# Patient Record
Sex: FEMALE | Race: OTHER | HISPANIC OR LATINO | URBAN - METROPOLITAN AREA
[De-identification: names, ages, dates, MRNs, and addresses within clinical notes are randomized per-mention and may not be internally consistent; named-entity substitution may affect disease eponyms.]

---

## 2020-12-06 ENCOUNTER — EMERGENCY (EMERGENCY)
Facility: HOSPITAL | Age: 85
LOS: 1 days | Discharge: HOME | End: 2020-12-06
Attending: EMERGENCY MEDICINE | Admitting: EMERGENCY MEDICINE
Payer: COMMERCIAL

## 2020-12-06 VITALS
TEMPERATURE: 98 F | WEIGHT: 149.91 LBS | RESPIRATION RATE: 15 BRPM | HEART RATE: 76 BPM | HEIGHT: 66 IN | DIASTOLIC BLOOD PRESSURE: 78 MMHG | SYSTOLIC BLOOD PRESSURE: 133 MMHG | OXYGEN SATURATION: 98 %

## 2020-12-06 VITALS
RESPIRATION RATE: 15 BRPM | TEMPERATURE: 99 F | DIASTOLIC BLOOD PRESSURE: 77 MMHG | SYSTOLIC BLOOD PRESSURE: 164 MMHG | OXYGEN SATURATION: 98 % | HEART RATE: 66 BPM

## 2020-12-06 LAB
ALBUMIN SERPL ELPH-MCNC: 3.8 G/DL — SIGNIFICANT CHANGE UP (ref 3.3–5)
ALP SERPL-CCNC: 97 U/L — SIGNIFICANT CHANGE UP (ref 40–120)
ALT FLD-CCNC: 19 U/L — SIGNIFICANT CHANGE UP (ref 12–78)
ANION GAP SERPL CALC-SCNC: 9 MMOL/L — SIGNIFICANT CHANGE UP (ref 5–17)
AST SERPL-CCNC: 28 U/L — SIGNIFICANT CHANGE UP (ref 15–37)
BASOPHILS # BLD AUTO: 0.1 K/UL — SIGNIFICANT CHANGE UP (ref 0–0.2)
BASOPHILS NFR BLD AUTO: 1.4 % — SIGNIFICANT CHANGE UP (ref 0–2)
BILIRUB SERPL-MCNC: 0.3 MG/DL — SIGNIFICANT CHANGE UP (ref 0.2–1.2)
BUN SERPL-MCNC: 28 MG/DL — HIGH (ref 7–23)
CALCIUM SERPL-MCNC: 9 MG/DL — SIGNIFICANT CHANGE UP (ref 8.5–10.1)
CHLORIDE SERPL-SCNC: 115 MMOL/L — HIGH (ref 96–108)
CO2 SERPL-SCNC: 19 MMOL/L — LOW (ref 22–31)
CREAT SERPL-MCNC: 1.4 MG/DL — HIGH (ref 0.5–1.3)
EOSINOPHIL # BLD AUTO: 0.62 K/UL — HIGH (ref 0–0.5)
EOSINOPHIL NFR BLD AUTO: 8.8 % — HIGH (ref 0–6)
GLUCOSE SERPL-MCNC: 107 MG/DL — HIGH (ref 70–99)
HCT VFR BLD CALC: 29 % — LOW (ref 34.5–45)
HGB BLD-MCNC: 9.1 G/DL — LOW (ref 11.5–15.5)
IMM GRANULOCYTES NFR BLD AUTO: 0.4 % — SIGNIFICANT CHANGE UP (ref 0–1.5)
LIDOCAIN IGE QN: 332 U/L — SIGNIFICANT CHANGE UP (ref 73–393)
LYMPHOCYTES # BLD AUTO: 0.81 K/UL — LOW (ref 1–3.3)
LYMPHOCYTES # BLD AUTO: 11.5 % — LOW (ref 13–44)
MCHC RBC-ENTMCNC: 29.7 PG — SIGNIFICANT CHANGE UP (ref 27–34)
MCHC RBC-ENTMCNC: 31.4 GM/DL — LOW (ref 32–36)
MCV RBC AUTO: 94.8 FL — SIGNIFICANT CHANGE UP (ref 80–100)
MONOCYTES # BLD AUTO: 1.26 K/UL — HIGH (ref 0–0.9)
MONOCYTES NFR BLD AUTO: 17.9 % — HIGH (ref 2–14)
NEUTROPHILS # BLD AUTO: 4.2 K/UL — SIGNIFICANT CHANGE UP (ref 1.8–7.4)
NEUTROPHILS NFR BLD AUTO: 60 % — SIGNIFICANT CHANGE UP (ref 43–77)
NRBC # BLD: 0 /100 WBCS — SIGNIFICANT CHANGE UP (ref 0–0)
PLATELET # BLD AUTO: 273 K/UL — SIGNIFICANT CHANGE UP (ref 150–400)
POTASSIUM SERPL-MCNC: 4.3 MMOL/L — SIGNIFICANT CHANGE UP (ref 3.5–5.3)
POTASSIUM SERPL-SCNC: 4.3 MMOL/L — SIGNIFICANT CHANGE UP (ref 3.5–5.3)
PROT SERPL-MCNC: 9.4 G/DL — HIGH (ref 6–8.3)
RBC # BLD: 3.06 M/UL — LOW (ref 3.8–5.2)
RBC # FLD: 18.9 % — HIGH (ref 10.3–14.5)
SODIUM SERPL-SCNC: 143 MMOL/L — SIGNIFICANT CHANGE UP (ref 135–145)
TROPONIN I SERPL-MCNC: <.015 NG/ML — SIGNIFICANT CHANGE UP (ref 0.01–0.04)
WBC # BLD: 7.02 K/UL — SIGNIFICANT CHANGE UP (ref 3.8–10.5)
WBC # FLD AUTO: 7.02 K/UL — SIGNIFICANT CHANGE UP (ref 3.8–10.5)

## 2020-12-06 PROCEDURE — 96374 THER/PROPH/DIAG INJ IV PUSH: CPT

## 2020-12-06 PROCEDURE — 71045 X-RAY EXAM CHEST 1 VIEW: CPT | Mod: 26

## 2020-12-06 PROCEDURE — 71045 X-RAY EXAM CHEST 1 VIEW: CPT

## 2020-12-06 PROCEDURE — 80053 COMPREHEN METABOLIC PANEL: CPT

## 2020-12-06 PROCEDURE — 83690 ASSAY OF LIPASE: CPT

## 2020-12-06 PROCEDURE — 84484 ASSAY OF TROPONIN QUANT: CPT

## 2020-12-06 PROCEDURE — 70450 CT HEAD/BRAIN W/O DYE: CPT | Mod: 26

## 2020-12-06 PROCEDURE — 36415 COLL VENOUS BLD VENIPUNCTURE: CPT

## 2020-12-06 PROCEDURE — 93010 ELECTROCARDIOGRAM REPORT: CPT

## 2020-12-06 PROCEDURE — 99285 EMERGENCY DEPT VISIT HI MDM: CPT

## 2020-12-06 PROCEDURE — 99284 EMERGENCY DEPT VISIT MOD MDM: CPT | Mod: 25

## 2020-12-06 PROCEDURE — 85025 COMPLETE CBC W/AUTO DIFF WBC: CPT

## 2020-12-06 PROCEDURE — 96375 TX/PRO/DX INJ NEW DRUG ADDON: CPT

## 2020-12-06 PROCEDURE — 70450 CT HEAD/BRAIN W/O DYE: CPT

## 2020-12-06 PROCEDURE — 93005 ELECTROCARDIOGRAM TRACING: CPT

## 2020-12-06 RX ORDER — SODIUM CHLORIDE 9 MG/ML
1000 INJECTION INTRAMUSCULAR; INTRAVENOUS; SUBCUTANEOUS ONCE
Refills: 0 | Status: COMPLETED | OUTPATIENT
Start: 2020-12-06 | End: 2020-12-06

## 2020-12-06 RX ORDER — FAMOTIDINE 10 MG/ML
20 INJECTION INTRAVENOUS ONCE
Refills: 0 | Status: COMPLETED | OUTPATIENT
Start: 2020-12-06 | End: 2020-12-06

## 2020-12-06 RX ORDER — DIPHENHYDRAMINE HCL 50 MG
25 CAPSULE ORAL ONCE
Refills: 0 | Status: COMPLETED | OUTPATIENT
Start: 2020-12-06 | End: 2020-12-06

## 2020-12-06 RX ADMIN — SODIUM CHLORIDE 1000 MILLILITER(S): 9 INJECTION INTRAMUSCULAR; INTRAVENOUS; SUBCUTANEOUS at 16:59

## 2020-12-06 RX ADMIN — Medication 25 MILLIGRAM(S): at 17:32

## 2020-12-06 RX ADMIN — SODIUM CHLORIDE 1000 MILLILITER(S): 9 INJECTION INTRAMUSCULAR; INTRAVENOUS; SUBCUTANEOUS at 15:59

## 2020-12-06 RX ADMIN — Medication 125 MILLIGRAM(S): at 17:32

## 2020-12-06 RX ADMIN — FAMOTIDINE 20 MILLIGRAM(S): 10 INJECTION INTRAVENOUS at 17:32

## 2020-12-06 NOTE — ED ADULT NURSE NOTE - CHPI ED NUR SYMPTOMS NEG
no back pain/no congestion/no diaphoresis/no dizziness/no syncope/no chest pain/no chills/no nausea/no shortness of breath/no vomiting/no fever

## 2020-12-06 NOTE — ED PROVIDER NOTE - CARE PLAN
Principal Discharge DX:	Vasovagal syncope   Principal Discharge DX:	Vasovagal syncope  Secondary Diagnosis:	Allergic reaction, initial encounter

## 2020-12-06 NOTE — ED PROVIDER NOTE - OBJECTIVE STATEMENT
84 y/o F from home with c/o syncope after having a  BM today resulting in vomiting x 1.  Pt now asymptomatic.  Pt denies cp, sob, similar symptoms in the past.

## 2020-12-06 NOTE — ED PROVIDER NOTE - PATIENT PORTAL LINK FT
You can access the FollowMyHealth Patient Portal offered by [facility] by registering at the following website: [email or web address]. By joining [email or web address]’s FollowMyHealth portal, you will also be able to view your health information using other applications (apps) compatible with our system.

## 2020-12-06 NOTE — ED ADULT NURSE NOTE - OBJECTIVE STATEMENT
85 year old female presents to the ED complaining of syncope. Patient Turkish speaking. As per daughter, patient complained of itching to vagina and anus x 1 week, daughter applied preparation H to anus and Vagisil to vagina with relief. Daughter kept applying Desitin and goldbaum lotion with relief. Daughter reports on Friday night patient was unable to sleep due to itching that returned. Today, daughter reports one hour prior to arrival, patient was on toilet and calling out for daughter, she complained of dizziness. Patient had a syncopal episode on the toilet then vomited. Daughter was concerned about the syncope so she brought the patient to the ED for evaluation. In the ED patient only complains of itching. No rash noted. Patient denies headache, dizziness, chest pain, or palpitations. Daughter and patient denies recent illness or sick contacts. Denies known COVID exposure. Daughter reports cataracts done two weeks ago, sunglasses on patient, follow up appointment was good.

## 2020-12-06 NOTE — ED ADULT NURSE REASSESSMENT NOTE - NS ED NURSE REASSESS COMMENT FT1
Discharge discussed and reviewed with daughter Natasha over the phone. Comprehensive teaching provided. Daughter demonstrates understanding of outpatient follow up plan.

## 2020-12-06 NOTE — ED ADULT NURSE NOTE - NSIMPLEMENTINTERV_GEN_ALL_ED
Implemented All Fall Risk Interventions:  [place]obler to call system. Call bell, personal items and telephone within reach. Instruct patient to call for assistance. Room bathroom lighting operational. Non-slip footwear when patient is off stretcher. Physically safe environment: no spills, clutter or unnecessary equipment. Stretcher in lowest position, wheels locked, appropriate side rails in place. Provide visual cue, wrist band, yellow gown, etc. Monitor gait and stability. Monitor for mental status changes and reorient to person, place, and time. Review medications for side effects contributing to fall risk. Reinforce activity limits and safety measures with patient and family.

## 2023-02-28 ENCOUNTER — INPATIENT (INPATIENT)
Facility: HOSPITAL | Age: 88
LOS: 2 days | Discharge: NURSING, REHAB OR LONG-TERM CARE | End: 2023-03-03
Attending: HOSPITALIST | Admitting: HOSPITALIST
Payer: MEDICARE

## 2023-02-28 VITALS
DIASTOLIC BLOOD PRESSURE: 82 MMHG | RESPIRATION RATE: 18 BRPM | HEART RATE: 88 BPM | OXYGEN SATURATION: 96 % | SYSTOLIC BLOOD PRESSURE: 142 MMHG | TEMPERATURE: 99 F

## 2023-02-28 DIAGNOSIS — R56.9 UNSPECIFIED CONVULSIONS: ICD-10-CM

## 2023-02-28 LAB
ALBUMIN SERPL ELPH-MCNC: 4.4 G/DL — SIGNIFICANT CHANGE UP (ref 3.3–5)
ALP SERPL-CCNC: 74 U/L — SIGNIFICANT CHANGE UP (ref 40–120)
ALT FLD-CCNC: 16 U/L — SIGNIFICANT CHANGE UP (ref 4–33)
ANION GAP SERPL CALC-SCNC: 11 MMOL/L — SIGNIFICANT CHANGE UP (ref 7–14)
ANISOCYTOSIS BLD QL: SIGNIFICANT CHANGE UP
APPEARANCE UR: CLEAR — SIGNIFICANT CHANGE UP
AST SERPL-CCNC: 43 U/L — HIGH (ref 4–32)
BACTERIA # UR AUTO: NEGATIVE — SIGNIFICANT CHANGE UP
BASE EXCESS BLDV CALC-SCNC: 4.1 MMOL/L — HIGH (ref -2–3)
BASOPHILS # BLD AUTO: 0.15 K/UL — SIGNIFICANT CHANGE UP (ref 0–0.2)
BASOPHILS NFR BLD AUTO: 2.6 % — HIGH (ref 0–2)
BILIRUB SERPL-MCNC: 0.6 MG/DL — SIGNIFICANT CHANGE UP (ref 0.2–1.2)
BILIRUB UR-MCNC: NEGATIVE — SIGNIFICANT CHANGE UP
BLOOD GAS VENOUS COMPREHENSIVE RESULT: SIGNIFICANT CHANGE UP
BUN SERPL-MCNC: 19 MG/DL — SIGNIFICANT CHANGE UP (ref 7–23)
CALCIUM SERPL-MCNC: 10 MG/DL — SIGNIFICANT CHANGE UP (ref 8.4–10.5)
CHLORIDE BLDV-SCNC: 105 MMOL/L — SIGNIFICANT CHANGE UP (ref 96–108)
CHLORIDE SERPL-SCNC: 104 MMOL/L — SIGNIFICANT CHANGE UP (ref 98–107)
CK SERPL-CCNC: 142 U/L — SIGNIFICANT CHANGE UP (ref 25–170)
CO2 BLDV-SCNC: 31.8 MMOL/L — HIGH (ref 22–26)
CO2 SERPL-SCNC: 25 MMOL/L — SIGNIFICANT CHANGE UP (ref 22–31)
COLOR SPEC: SIGNIFICANT CHANGE UP
CREAT SERPL-MCNC: 1 MG/DL — SIGNIFICANT CHANGE UP (ref 0.5–1.3)
DIFF PNL FLD: NEGATIVE — SIGNIFICANT CHANGE UP
EGFR: 55 ML/MIN/1.73M2 — LOW
EOSINOPHIL # BLD AUTO: 1.41 K/UL — HIGH (ref 0–0.5)
EOSINOPHIL NFR BLD AUTO: 24.1 % — HIGH (ref 0–6)
EPI CELLS # UR: 11 /HPF — HIGH (ref 0–5)
FLUAV AG NPH QL: SIGNIFICANT CHANGE UP
FLUBV AG NPH QL: SIGNIFICANT CHANGE UP
GAS PNL BLDV: 137 MMOL/L — SIGNIFICANT CHANGE UP (ref 136–145)
GAS PNL BLDV: SIGNIFICANT CHANGE UP
GIANT PLATELETS BLD QL SMEAR: PRESENT — SIGNIFICANT CHANGE UP
GLUCOSE BLDV-MCNC: 89 MG/DL — SIGNIFICANT CHANGE UP (ref 70–99)
GLUCOSE SERPL-MCNC: 92 MG/DL — SIGNIFICANT CHANGE UP (ref 70–99)
GLUCOSE UR QL: NEGATIVE — SIGNIFICANT CHANGE UP
HCO3 BLDV-SCNC: 30 MMOL/L — HIGH (ref 22–29)
HCT VFR BLD CALC: 37.8 % — SIGNIFICANT CHANGE UP (ref 34.5–45)
HCT VFR BLDA CALC: 35 % — SIGNIFICANT CHANGE UP (ref 34.5–46.5)
HGB BLD CALC-MCNC: 11.6 G/DL — LOW (ref 11.7–16.1)
HGB BLD-MCNC: 11.3 G/DL — LOW (ref 11.5–15.5)
HYALINE CASTS # UR AUTO: 2 /LPF — SIGNIFICANT CHANGE UP (ref 0–7)
HYPOCHROMIA BLD QL: SLIGHT — SIGNIFICANT CHANGE UP
IANC: 1.76 K/UL — LOW (ref 1.8–7.4)
KETONES UR-MCNC: NEGATIVE — SIGNIFICANT CHANGE UP
LACTATE BLDV-MCNC: 1.2 MMOL/L — SIGNIFICANT CHANGE UP (ref 0.5–2)
LEUKOCYTE ESTERASE UR-ACNC: ABNORMAL
LIDOCAIN IGE QN: 87 U/L — HIGH (ref 7–60)
LYMPHOCYTES # BLD AUTO: 1.06 K/UL — SIGNIFICANT CHANGE UP (ref 1–3.3)
LYMPHOCYTES # BLD AUTO: 18.1 % — SIGNIFICANT CHANGE UP (ref 13–44)
MACROCYTES BLD QL: SIGNIFICANT CHANGE UP
MAGNESIUM SERPL-MCNC: 1.8 MG/DL — SIGNIFICANT CHANGE UP (ref 1.6–2.6)
MCHC RBC-ENTMCNC: 28.4 PG — SIGNIFICANT CHANGE UP (ref 27–34)
MCHC RBC-ENTMCNC: 29.9 GM/DL — LOW (ref 32–36)
MCV RBC AUTO: 95 FL — SIGNIFICANT CHANGE UP (ref 80–100)
MONOCYTES # BLD AUTO: 1.51 K/UL — HIGH (ref 0–0.9)
MONOCYTES NFR BLD AUTO: 25.9 % — HIGH (ref 2–14)
NEUTROPHILS # BLD AUTO: 1.61 K/UL — LOW (ref 1.8–7.4)
NEUTROPHILS NFR BLD AUTO: 27.6 % — LOW (ref 43–77)
NITRITE UR-MCNC: NEGATIVE — SIGNIFICANT CHANGE UP
NT-PROBNP SERPL-SCNC: 399 PG/ML — HIGH
OVALOCYTES BLD QL SMEAR: SLIGHT — SIGNIFICANT CHANGE UP
PCO2 BLDV: 51 MMHG — SIGNIFICANT CHANGE UP (ref 39–52)
PH BLDV: 7.38 — SIGNIFICANT CHANGE UP (ref 7.32–7.43)
PH UR: 6 — SIGNIFICANT CHANGE UP (ref 5–8)
PLAT MORPH BLD: ABNORMAL
PLATELET # BLD AUTO: 180 K/UL — SIGNIFICANT CHANGE UP (ref 150–400)
PLATELET COUNT - ESTIMATE: NORMAL — SIGNIFICANT CHANGE UP
PO2 BLDV: 35 MMHG — SIGNIFICANT CHANGE UP (ref 25–45)
POIKILOCYTOSIS BLD QL AUTO: SLIGHT — SIGNIFICANT CHANGE UP
POLYCHROMASIA BLD QL SMEAR: SLIGHT — SIGNIFICANT CHANGE UP
POTASSIUM BLDV-SCNC: 3.7 MMOL/L — SIGNIFICANT CHANGE UP (ref 3.5–5.1)
POTASSIUM SERPL-MCNC: 4.8 MMOL/L — SIGNIFICANT CHANGE UP (ref 3.5–5.3)
POTASSIUM SERPL-SCNC: 4.8 MMOL/L — SIGNIFICANT CHANGE UP (ref 3.5–5.3)
PROCALCITONIN SERPL-MCNC: 0.06 NG/ML — SIGNIFICANT CHANGE UP (ref 0.02–0.1)
PROT SERPL-MCNC: 8.7 G/DL — HIGH (ref 6–8.3)
PROT UR-MCNC: ABNORMAL
RBC # BLD: 3.98 M/UL — SIGNIFICANT CHANGE UP (ref 3.8–5.2)
RBC # FLD: 17.6 % — HIGH (ref 10.3–14.5)
RBC BLD AUTO: ABNORMAL
RBC CASTS # UR COMP ASSIST: 4 /HPF — SIGNIFICANT CHANGE UP (ref 0–4)
RSV RNA NPH QL NAA+NON-PROBE: SIGNIFICANT CHANGE UP
SAO2 % BLDV: 61.2 % — LOW (ref 67–88)
SARS-COV-2 RNA SPEC QL NAA+PROBE: SIGNIFICANT CHANGE UP
SODIUM SERPL-SCNC: 140 MMOL/L — SIGNIFICANT CHANGE UP (ref 135–145)
SP GR SPEC: 1.02 — SIGNIFICANT CHANGE UP (ref 1.01–1.05)
TROPONIN T, HIGH SENSITIVITY RESULT: 25 NG/L — SIGNIFICANT CHANGE UP
TROPONIN T, HIGH SENSITIVITY RESULT: 28 NG/L — SIGNIFICANT CHANGE UP
TSH SERPL-MCNC: 1.72 UIU/ML — SIGNIFICANT CHANGE UP (ref 0.27–4.2)
UROBILINOGEN FLD QL: SIGNIFICANT CHANGE UP
VARIANT LYMPHS # BLD: 1.7 % — SIGNIFICANT CHANGE UP (ref 0–6)
WBC # BLD: 5.84 K/UL — SIGNIFICANT CHANGE UP (ref 3.8–10.5)
WBC # FLD AUTO: 5.84 K/UL — SIGNIFICANT CHANGE UP (ref 3.8–10.5)
WBC UR QL: 15 /HPF — HIGH (ref 0–5)

## 2023-02-28 PROCEDURE — 70450 CT HEAD/BRAIN W/O DYE: CPT | Mod: 26,MA

## 2023-02-28 PROCEDURE — 99285 EMERGENCY DEPT VISIT HI MDM: CPT

## 2023-02-28 PROCEDURE — 99223 1ST HOSP IP/OBS HIGH 75: CPT

## 2023-02-28 PROCEDURE — 71045 X-RAY EXAM CHEST 1 VIEW: CPT | Mod: 26

## 2023-02-28 RX ORDER — CEFTRIAXONE 500 MG/1
1000 INJECTION, POWDER, FOR SOLUTION INTRAMUSCULAR; INTRAVENOUS ONCE
Refills: 0 | Status: COMPLETED | OUTPATIENT
Start: 2023-02-28 | End: 2023-02-28

## 2023-02-28 RX ORDER — LEVETIRACETAM 250 MG/1
1000 TABLET, FILM COATED ORAL ONCE
Refills: 0 | Status: COMPLETED | OUTPATIENT
Start: 2023-02-28 | End: 2023-02-28

## 2023-02-28 RX ORDER — LOSARTAN POTASSIUM 100 MG/1
100 TABLET, FILM COATED ORAL ONCE
Refills: 0 | Status: COMPLETED | OUTPATIENT
Start: 2023-02-28 | End: 2023-02-28

## 2023-02-28 RX ORDER — SODIUM CHLORIDE 9 MG/ML
1000 INJECTION INTRAMUSCULAR; INTRAVENOUS; SUBCUTANEOUS ONCE
Refills: 0 | Status: COMPLETED | OUTPATIENT
Start: 2023-02-28 | End: 2023-02-28

## 2023-02-28 RX ADMIN — SODIUM CHLORIDE 1000 MILLILITER(S): 9 INJECTION INTRAMUSCULAR; INTRAVENOUS; SUBCUTANEOUS at 19:21

## 2023-02-28 RX ADMIN — LEVETIRACETAM 1000 MILLIGRAM(S): 250 TABLET, FILM COATED ORAL at 19:36

## 2023-02-28 RX ADMIN — LOSARTAN POTASSIUM 100 MILLIGRAM(S): 100 TABLET, FILM COATED ORAL at 23:04

## 2023-02-28 RX ADMIN — LEVETIRACETAM 400 MILLIGRAM(S): 250 TABLET, FILM COATED ORAL at 19:21

## 2023-02-28 RX ADMIN — SODIUM CHLORIDE 1000 MILLILITER(S): 9 INJECTION INTRAMUSCULAR; INTRAVENOUS; SUBCUTANEOUS at 20:21

## 2023-02-28 RX ADMIN — CEFTRIAXONE 100 MILLIGRAM(S): 500 INJECTION, POWDER, FOR SOLUTION INTRAMUSCULAR; INTRAVENOUS at 20:52

## 2023-02-28 NOTE — H&P ADULT - PROBLEM SELECTOR PLAN 5
- Place on ISS, FS qAC, CC diet  - Patient unsure of her medications, reconciled as per [employer] and ED note, med history pharmacist emailed for help in her medication reconciliation

## 2023-02-28 NOTE — CONSULT NOTE ADULT - ATTENDING COMMENTS
Episodes of unresponsiveness of unclear semiology.  Was started on levetiracetam - unsure when.  Chronic memory issues since childhood.  Severe itching.  #[id number].     Exam: Face - symmetric. Pupils 4-->3, B.   Moving all ext with good, symmetric strength.   Reflexes 2+ in arms and 0 in legs.     CT head reviewed.       A/P  Ms. Kimbrough is an 86 yo woman with episodes of unresponsiveness of unclear semiology and was started on levetiracetam - unsure by whom and when.  She does not have a neurologist.   Admit to [facility] EMU for characterization of episodes - 48 hours EEG.  Continue home dose of levetiracetam 500 mg BID.  F/U outpatient with the epilepsy team [phone].  D/W neurology resident who spoke with primary team.   Thank you Episodes of unresponsiveness of unclear semiology.  Was started on levetiracetam - unsure when.  Chronic memory issues since childhood.  Severe itching.  #[id number].     Exam: Face - symmetric. Pupils 4-->3, B.   Moving all ext with good, symmetric strength.   Reflexes 2+ in arms and 0 in legs.     CT head reviewed.       A/P  Ms. Kimbrough is an 88 yo woman with episodes of unresponsiveness of unclear semiology and was started on levetiracetam - unsure by whom and when.  She does not have a neurologist.   Admit to [facility] EMU for characterization of episodes - 48 hours EEG.  Continue home dose of levetiracetam 500 mg BID.  F/U outpatient with the epilepsy team [phone].  D/W neurology resident who spoke with primary team.   Thank you Episodes of unresponsiveness of unclear semiology.  Was started on levetiracetam - unsure when.  Chronic memory issues since childhood.  Severe itching.  #[id number].     Exam: Face - symmetric. Pupils 4-->3, B.   Moving all ext with good, symmetric strength.   Reflexes 2+ in arms and 0 in legs.     CT head reviewed.       A/P  Ms. Kimbrough is an 86 yo woman with episodes of unresponsiveness of unclear semiology and was started on levetiracetam - unsure by whom and when.  She does not have a neurologist.   Admit to [facility] EMU for characterization of episodes - 48 hours EEG.  Continue home dose of levetiracetam 500 mg BID.  F/U outpatient with the epilepsy team [phone].  D/W neurology resident who spoke with primary team.   Thank you

## 2023-02-28 NOTE — H&P ADULT - NSICDXPASTMEDICALHX_GEN_ALL_CORE_FT
PAST MEDICAL HISTORY:  Asthma     DM (diabetes mellitus)     Essential hypertension     History of seizure disorder     Hyperlipidemia     Rheumatoid disease

## 2023-02-28 NOTE — ED ADULT NURSE NOTE - ISOLATION TYPE:
came to ED for abdominal pain x2 days   today pt had nausea, vomiting for 2 hours   c/o reflux and "heartburn"
None

## 2023-02-28 NOTE — CONSULT NOTE ADULT - SUBJECTIVE AND OBJECTIVE BOX
MRN-[id number]  Patient is a 87y old  Female who presents with a chief complaint of   HPI:  Patient is a 88yo Rt handed Qat[place] speaking F with pmh of DM2, HTN, HLD,RA on prednisone and leflunomide, asthma on montelukast, seizure disorder on Keppra 500mg BID presents for episodes of unresponsiveness. History obtained by son, whom states patient has been having increasing episodes of unresponsiveness over the past few days. Patient's family is concern for seizures, however it is unknown what the semiology of the patient's seizures are. Patient is currently on Keppra 500mg BID and is uncertain if patient is compliant with medications. Patient's episodes of unresponsiveness occurred at 1550pm tody and had another episodes 2 days prior. Patient was bought in via EMS. Patient has been noticing ot have increase in urinary frequency. Patient noted to have UTI on admission.       PAST MEDICAL & SURGICAL HISTORY:  DM (diabetes mellitus)      History of seizure disorder      Rheumatoid disease        FAMILY HISTORY:    Social Hx:  Nonsmoker, no drug or alcohol use    Home Medications:    MEDICATIONS  (STANDING):    MEDICATIONS  (PRN):    Allergies  No Known Allergies    Intolerances      REVIEW OF SYSTEMS  General:	  Ophthalmologic:  Respiratory and Thorax:	  Cardiovascular:	  Gastrointestinal:	  Genitourinary:	  Musculoskeletal:	  Neurological:		    ROS: Pertinent positives in HPI, all other ROS were reviewed and are negative.      Vital Signs Last 24 Hrs  T(C): 36.9 (2023 19:48), Max: 37 (2023 17:23)  T(F): 98.5 (2023 19:48), Max: 98.6 (2023 17:23)  HR: 75 (2023 19:48) (75 - 88)  BP: 190/78 (2023 19:48) (142/82 - 190/78)  BP(mean): --  RR: 16 (2023 19:48) (16 - 18)  SpO2: 98% (2023 19:48) (96% - 98%)    Parameters below as of 2023 19:48  Patient On (Oxygen Delivery Method): room air        GENERAL EXAM:  Constitutional: awake and alert. NAD  HEENT: PERRL, EOMI  Musculoskeletal: no joint swelling/tenderness, no abnormal movements  Skin: no rashes    NEUROLOGICAL EXAM:  MS: AAox2 to person and place and follows commands.   CN: VFF, EOMI, PERRL  V1-3 intact, no facial asymmetry, t/p midline, SCM/trap intact.  Motor: Strength: all extremities are antigravity    Tone: normal. Bulk: normal.   DTR 2+ symm.    Plantar flex b/l.   Sensation: intact to 4x.   Coordination:     NIHSS  mRS    Labs:   cbc                      11.3   5.84  )-----------( 180      ( 2023 19:18 )             37.8     [id number]    140  |  104  |  19  ----------------------------<  92  4.8   |  25  |  1.00    Ca    10.0      2023 19:18  Mg     1.80         TPro  8.7<H>  /  Alb  4.4  /  TBili  0.6  /  DBili  x   /  AST  43<H>  /  ALT  16  /  AlkPhos  74      CardiacMarkersCARDIAC MARKERS ( 2023 19:18 )  x     / x     / 142 U/L / x     / x          LFTsLIVER FUNCTIONS - ( 2023 19:18 )  Alb: 4.4 g/dL / Pro: 8.7 g/dL / ALK PHOS: 74 U/L / ALT: 16 U/L / AST: 43 U/L / GGT: x           UAUrinalysis Basic - ( 2023 19:18 )    Color: Light Yellow / Appearance: Clear / S.016 / pH: x  Gluc: x / Ketone: Negative  / Bili: Negative / Urobili: <2 mg/dL   Blood: x / Protein: Trace / Nitrite: Negative   Leuk Esterase: Large / RBC: 4 /HPF / WBC 15 /HPF   Sq Epi: x / Non Sq Epi: 11 /HPF / Bacteria: Negative      Radiology:   MRN-[id number]  Patient is a 87y old  Female who presents with a chief complaint of   HPI:  Patient is a 88yo Rt handed British speaking F with pmh of DM2, HTN, HLD,RA on prednisone and leflunomide, asthma on montelukast, seizure disorder on Keppra 500mg BID presents for episodes of unresponsiveness. History obtained by son, whom states patient has been having increasing episodes of unresponsiveness over the past few days. Patient's family is concern for seizures, however it is unknown what the semiology of the patient's seizures are. Patient is currently on Keppra 500mg BID and is uncertain if patient is compliant with medications. Patient's episodes of unresponsiveness occurred at 1550pm tody and had another episodes 2 days prior. Patient was bought in via EMS. Patient has been noticing ot have increase in urinary frequency. Patient noted to have UTI on admission.       PAST MEDICAL & SURGICAL HISTORY:  DM (diabetes mellitus)      History of seizure disorder      Rheumatoid disease        FAMILY HISTORY:    Social Hx:  Nonsmoker, no drug or alcohol use    Home Medications:    MEDICATIONS  (STANDING):    MEDICATIONS  (PRN):    Allergies  No Known Allergies    Intolerances      REVIEW OF SYSTEMS  General:	  Ophthalmologic:  Respiratory and Thorax:	  Cardiovascular:	  Gastrointestinal:	  Genitourinary:	  Musculoskeletal:	  Neurological:		    ROS: Pertinent positives in HPI, all other ROS were reviewed and are negative.      Vital Signs Last 24 Hrs  T(C): 36.9 (2023 19:48), Max: 37 (2023 17:23)  T(F): 98.5 (2023 19:48), Max: 98.6 (2023 17:23)  HR: 75 (2023 19:48) (75 - 88)  BP: 190/78 (2023 19:48) (142/82 - 190/78)  BP(mean): --  RR: 16 (2023 19:48) (16 - 18)  SpO2: 98% (2023 19:48) (96% - 98%)    Parameters below as of 2023 19:48  Patient On (Oxygen Delivery Method): room air        GENERAL EXAM:  Constitutional: awake and alert. NAD  HEENT: PERRL, EOMI  Musculoskeletal: no joint swelling/tenderness, no abnormal movements  Skin: no rashes    NEUROLOGICAL EXAM:  MS: AAox2 to person and place and follows commands.   CN: VFF, EOMI, PERRL  V1-3 intact, no facial asymmetry, t/p midline, SCM/trap intact.  Motor: Strength: all extremities are antigravity    Tone: normal. Bulk: normal.   DTR 2+ symm.    Plantar flex b/l.   Sensation: intact to 4x.   Coordination:     NIHSS  mRS    Labs:   cbc                      11.3   5.84  )-----------( 180      ( 2023 19:18 )             37.8     [id number]    140  |  104  |  19  ----------------------------<  92  4.8   |  25  |  1.00    Ca    10.0      2023 19:18  Mg     1.80         TPro  8.7<H>  /  Alb  4.4  /  TBili  0.6  /  DBili  x   /  AST  43<H>  /  ALT  16  /  AlkPhos  74      CardiacMarkersCARDIAC MARKERS ( 2023 19:18 )  x     / x     / 142 U/L / x     / x          LFTsLIVER FUNCTIONS - ( 2023 19:18 )  Alb: 4.4 g/dL / Pro: 8.7 g/dL / ALK PHOS: 74 U/L / ALT: 16 U/L / AST: 43 U/L / GGT: x           UAUrinalysis Basic - ( 2023 19:18 )    Color: Light Yellow / Appearance: Clear / S.016 / pH: x  Gluc: x / Ketone: Negative  / Bili: Negative / Urobili: <2 mg/dL   Blood: x / Protein: Trace / Nitrite: Negative   Leuk Esterase: Large / RBC: 4 /HPF / WBC 15 /HPF   Sq Epi: x / Non Sq Epi: 11 /HPF / Bacteria: Negative      Radiology:   MRN-[id number]  Patient is a 87y old  Female who presents with a chief complaint of   HPI:  Patient is a 86yo Rt handed Citizen of [place] speaking F with pmh of DM2, HTN, HLD,RA on prednisone and leflunomide, asthma on montelukast, seizure disorder on Keppra 500mg BID presents for episodes of unresponsiveness. History obtained by son, whom states patient has been having increasing episodes of unresponsiveness over the past few days. Patient's family is concern for seizures, however it is unknown what the semiology of the patient's seizures are. Patient is currently on Keppra 500mg BID and is uncertain if patient is compliant with medications. Patient's episodes of unresponsiveness occurred at 1550pm tody and had another episodes 2 days prior. Patient was bought in via EMS. Patient has been noticing ot have increase in urinary frequency. Patient noted to have UTI on admission.       PAST MEDICAL & SURGICAL HISTORY:  DM (diabetes mellitus)      History of seizure disorder      Rheumatoid disease        FAMILY HISTORY:    Social Hx:  Nonsmoker, no drug or alcohol use    Home Medications:    MEDICATIONS  (STANDING):    MEDICATIONS  (PRN):    Allergies  No Known Allergies    Intolerances      REVIEW OF SYSTEMS  General:	  Ophthalmologic:  Respiratory and Thorax:	  Cardiovascular:	  Gastrointestinal:	  Genitourinary:	  Musculoskeletal:	  Neurological:		    ROS: Pertinent positives in HPI, all other ROS were reviewed and are negative.      Vital Signs Last 24 Hrs  T(C): 36.9 (2023 19:48), Max: 37 (2023 17:23)  T(F): 98.5 (2023 19:48), Max: 98.6 (2023 17:23)  HR: 75 (2023 19:48) (75 - 88)  BP: 190/78 (2023 19:48) (142/82 - 190/78)  BP(mean): --  RR: 16 (2023 19:48) (16 - 18)  SpO2: 98% (2023 19:48) (96% - 98%)    Parameters below as of 2023 19:48  Patient On (Oxygen Delivery Method): room air        GENERAL EXAM:  Constitutional: awake and alert. NAD  HEENT: PERRL, EOMI  Musculoskeletal: no joint swelling/tenderness, no abnormal movements  Skin: no rashes    NEUROLOGICAL EXAM:  MS: AAox2 to person and place and follows commands.   CN: VFF, EOMI, PERRL  V1-3 intact, no facial asymmetry, t/p midline, SCM/trap intact.  Motor: Strength: all extremities are antigravity    Tone: normal. Bulk: normal.   DTR 2+ symm.    Plantar flex b/l.   Sensation: intact to 4x.   Coordination:     NIHSS  mRS    Labs:   cbc                      11.3   5.84  )-----------( 180      ( 2023 19:18 )             37.8     [id number]    140  |  104  |  19  ----------------------------<  92  4.8   |  25  |  1.00    Ca    10.0      2023 19:18  Mg     1.80         TPro  8.7<H>  /  Alb  4.4  /  TBili  0.6  /  DBili  x   /  AST  43<H>  /  ALT  16  /  AlkPhos  74      CardiacMarkersCARDIAC MARKERS ( 2023 19:18 )  x     / x     / 142 U/L / x     / x          LFTsLIVER FUNCTIONS - ( 2023 19:18 )  Alb: 4.4 g/dL / Pro: 8.7 g/dL / ALK PHOS: 74 U/L / ALT: 16 U/L / AST: 43 U/L / GGT: x           UAUrinalysis Basic - ( 2023 19:18 )    Color: Light Yellow / Appearance: Clear / S.016 / pH: x  Gluc: x / Ketone: Negative  / Bili: Negative / Urobili: <2 mg/dL   Blood: x / Protein: Trace / Nitrite: Negative   Leuk Esterase: Large / RBC: 4 /HPF / WBC 15 /HPF   Sq Epi: x / Non Sq Epi: 11 /HPF / Bacteria: Negative      Radiology:   MRN-[id number]  Patient is a 87y old  Female who presents with a chief complaint of   HPI:  Patient is a 88yo Rt handed Romanian speaking F with pmh of DM2, HTN, HLD,RA on prednisone and leflunomide, asthma on montelukast, ?seizure disorder on Keppra 500mg BID presents for episodes of unresponsiveness. History obtained by son, whom states patient has been having increasing episodes of unresponsiveness over the past few days. Patient's family is concern for seizures, however it is unknown what the semiology of the patient's seizures are. Patient is currently on Keppra 500mg BID and is uncertain if patient is compliant with medications. Patient's episodes of unresponsiveness occurred at 1550pm tody and had another episodes 2 days prior. Patient was bought in via EMS. Patient has been noticing to have increase in urinary frequency. Patient noted to have UTI on admission. Patient states she was prescribed Keppra from [facility] and her son gets the medication refilled. Patient unsure when she was diagnosed with seizures.       PAST MEDICAL & SURGICAL HISTORY:  DM (diabetes mellitus)      History of seizure disorder      Rheumatoid disease        FAMILY HISTORY:    Social Hx:  Nonsmoker, no drug or alcohol use    Home Medications:    MEDICATIONS  (STANDING):    MEDICATIONS  (PRN):    Allergies  No Known Allergies    Intolerances      REVIEW OF SYSTEMS  General:Denies fever and chills	  Ophthalmologic: Denies blurred vision   Respiratory and Thorax:	Denies sob   Cardiovascular:	Denies CP  Gastrointestinal:	Denies N, V  Genitourinary:	Mentions UC  Musculoskeletal:	 Mentions muscle and joint pain   Neurological: Denies numbness and tingling 	    ROS: Pertinent positives in HPI, all other ROS were reviewed and are negative.      Vital Signs Last 24 Hrs  T(C): 36.9 (2023 19:48), Max: 37 (2023 17:23)  T(F): 98.5 (2023 19:48), Max: 98.6 (2023 17:23)  HR: 75 (2023 19:48) (75 - 88)  BP: 190/78 (2023 19:48) (142/82 - 190/78)  BP(mean): --  RR: 16 (2023 19:48) (16 - 18)  SpO2: 98% (2023 19:48) (96% - 98%)    Parameters below as of 2023 19:48  Patient On (Oxygen Delivery Method): room air        GENERAL EXAM:  Constitutional: awake and alert. NAD  HEENT: PERRL, EOMI  Musculoskeletal: no joint swelling/tenderness, no abnormal movements  Skin: no rashes    NEUROLOGICAL EXAM:  MS: AAox2 to person and place and follows commands.   CN: VFF, EOMI, PERRL  V1-3 intact, no facial asymmetry, t/p midline, SCM/trap intact.  Motor: Strength: all extremities are antigravity    Tone: normal. Bulk: normal.   Plantar flex b/l.   Sensation: intact to 4x.     Labs:   cbc                      11.3   5.84  )-----------( 180      ( 2023 19:18 )             37.8     [id number]    140  |  104  |  19  ----------------------------<  92  4.8   |  25  |  1.00    Ca    10.0      2023 19:18  Mg     1.80     -    TPro  8.7<H>  /  Alb  4.4  /  TBili  0.6  /  DBili  x   /  AST  43<H>  /  ALT  16  /  AlkPhos  74      CardiacMarkersCARDIAC MARKERS ( 2023 19:18 )  x     / x     / 142 U/L / x     / x          LFTsLIVER FUNCTIONS - ( 2023 19:18 )  Alb: 4.4 g/dL / Pro: 8.7 g/dL / ALK PHOS: 74 U/L / ALT: 16 U/L / AST: 43 U/L / GGT: x           UAUrinalysis Basic - ( 2023 19:18 )    Color: Light Yellow / Appearance: Clear / S.016 / pH: x  Gluc: x / Ketone: Negative  / Bili: Negative / Urobili: <2 mg/dL   Blood: x / Protein: Trace / Nitrite: Negative   Leuk Esterase: Large / RBC: 4 /HPF / WBC 15 /HPF   Sq Epi: x / Non Sq Epi: 11 /HPF / Bacteria: Negative      Radiology:   [id number]  Patient is a 87y old  Female who presents with a chief complaint of   HPI:  Patient is a 88yo Rt handed Grenadian speaking F with pmh of DM2, HTN, HLD,RA on prednisone and leflunomide, asthma on montelukast, ?seizure disorder on Keppra 500mg BID presents for episodes of unresponsiveness. History obtained by son, whom states patient has been having increasing episodes of unresponsiveness over the past few days. Patient's family is concern for seizures, however it is unknown what the semiology of the patient's seizures are. Patient is currently on Keppra 500mg BID and is uncertain if patient is compliant with medications. Patient's episodes of unresponsiveness occurred at 1550pm tody and had another episodes 2 days prior. Patient was bought in via EMS. Patient has been noticing to have increase in urinary frequency. Patient noted to have UTI on admission. Patient states she was prescribed Keppra from [facility] and her son gets the medication refilled. Patient unsure when she was diagnosed with seizures.       PAST MEDICAL & SURGICAL HISTORY:  DM (diabetes mellitus)      History of seizure disorder      Rheumatoid disease        FAMILY HISTORY:    Social Hx:  Nonsmoker, no drug or alcohol use    Home Medications:    MEDICATIONS  (STANDING):    MEDICATIONS  (PRN):    Allergies  No Known Allergies    Intolerances      REVIEW OF SYSTEMS  General:Denies fever and chills	  Ophthalmologic: Denies blurred vision   Respiratory and Thorax:	Denies sob   Cardiovascular:	Denies CP  Gastrointestinal:	Denies N, V  Genitourinary:	Mentions UC  Musculoskeletal:	 Mentions muscle and joint pain   Neurological: Denies numbness and tingling 	    ROS: Pertinent positives in HPI, all other ROS were reviewed and are negative.      Vital Signs Last 24 Hrs  T(C): 36.9 (2023 19:48), Max: 37 (2023 17:23)  T(F): 98.5 (2023 19:48), Max: 98.6 (2023 17:23)  HR: 75 (2023 19:48) (75 - 88)  BP: 190/78 (2023 19:48) (142/82 - 190/78)  BP(mean): --  RR: 16 (2023 19:48) (16 - 18)  SpO2: 98% (2023 19:48) (96% - 98%)    Parameters below as of 2023 19:48  Patient On (Oxygen Delivery Method): room air        GENERAL EXAM:  Constitutional: awake and alert. NAD  HEENT: PERRL, EOMI  Musculoskeletal: no joint swelling/tenderness, no abnormal movements  Skin: no rashes    NEUROLOGICAL EXAM:  MS: AAox2 to person and place and follows commands.   CN: VFF, EOMI, PERRL  V1-3 intact, no facial asymmetry, t/p midline, SCM/trap intact.  Motor: Strength: all extremities are antigravity    Tone: normal. Bulk: normal.   Plantar flex b/l.   Sensation: intact to 4x.     Labs:   cbc                      11.3   5.84  )-----------( 180      ( 2023 19:18 )             37.8     [id number]    140  |  104  |  19  ----------------------------<  92  4.8   |  25  |  1.00    Ca    10.0      2023 19:18  Mg     1.80     -    TPro  8.7<H>  /  Alb  4.4  /  TBili  0.6  /  DBili  x   /  AST  43<H>  /  ALT  16  /  AlkPhos  74      CardiacMarkersCARDIAC MARKERS ( 2023 19:18 )  x     / x     / 142 U/L / x     / x          LFTsLIVER FUNCTIONS - ( 2023 19:18 )  Alb: 4.4 g/dL / Pro: 8.7 g/dL / ALK PHOS: 74 U/L / ALT: 16 U/L / AST: 43 U/L / GGT: x           UAUrinalysis Basic - ( 2023 19:18 )    Color: Light Yellow / Appearance: Clear / S.016 / pH: x  Gluc: x / Ketone: Negative  / Bili: Negative / Urobili: <2 mg/dL   Blood: x / Protein: Trace / Nitrite: Negative   Leuk Esterase: Large / RBC: 4 /HPF / WBC 15 /HPF   Sq Epi: x / Non Sq Epi: 11 /HPF / Bacteria: Negative      Radiology:   MRN-[id number]  Patient is a 87y old  Female who presents with a chief complaint of   HPI:  Patient is a 86yo Rt handed [place] speaking F with pmh of DM2, HTN, HLD,RA on prednisone and leflunomide, asthma on montelukast, ?seizure disorder on Keppra 500mg BID presents for episodes of unresponsiveness. History obtained by son, whom states patient has been having increasing episodes of unresponsiveness over the past few days. Patient's family is concern for seizures, however it is unknown what the semiology of the patient's seizures are. Patient is currently on Keppra 500mg BID and is uncertain if patient is compliant with medications. Patient's episodes of unresponsiveness occurred at 1550pm tody and had another episodes 2 days prior. Patient was bought in via EMS. Patient has been noticing to have increase in urinary frequency. Patient noted to have UTI on admission. Patient states she was prescribed Keppra from [facility] and her son gets the medication refilled. Patient unsure when she was diagnosed with seizures.       PAST MEDICAL & SURGICAL HISTORY:  DM (diabetes mellitus)      History of seizure disorder      Rheumatoid disease        FAMILY HISTORY:    Social Hx:  Nonsmoker, no drug or alcohol use    Home Medications:    MEDICATIONS  (STANDING):    MEDICATIONS  (PRN):    Allergies  No Known Allergies    Intolerances      REVIEW OF SYSTEMS  General:Denies fever and chills	  Ophthalmologic: Denies blurred vision   Respiratory and Thorax:	Denies sob   Cardiovascular:	Denies CP  Gastrointestinal:	Denies N, V  Genitourinary:	Mentions UC  Musculoskeletal:	 Mentions muscle and joint pain   Neurological: Denies numbness and tingling 	    ROS: Pertinent positives in HPI, all other ROS were reviewed and are negative.      Vital Signs Last 24 Hrs  T(C): 36.9 (2023 19:48), Max: 37 (2023 17:23)  T(F): 98.5 (2023 19:48), Max: 98.6 (2023 17:23)  HR: 75 (2023 19:48) (75 - 88)  BP: 190/78 (2023 19:48) (142/82 - 190/78)  BP(mean): --  RR: 16 (2023 19:48) (16 - 18)  SpO2: 98% (2023 19:48) (96% - 98%)    Parameters below as of 2023 19:48  Patient On (Oxygen Delivery Method): room air        GENERAL EXAM:  Constitutional: awake and alert. NAD  HEENT: PERRL, EOMI  Musculoskeletal: no joint swelling/tenderness, no abnormal movements  Skin: no rashes    NEUROLOGICAL EXAM:  MS: AAox2 to person and place and follows commands.   CN: VFF, EOMI, PERRL  V1-3 intact, no facial asymmetry, t/p midline, SCM/trap intact.  Motor: Strength: all extremities are antigravity    Tone: normal. Bulk: normal.   Plantar flex b/l.   Sensation: intact to 4x.     Labs:   cbc                      11.3   5.84  )-----------( 180      ( 2023 19:18 )             37.8     [id number]    140  |  104  |  19  ----------------------------<  92  4.8   |  25  |  1.00    Ca    10.0      2023 19:18  Mg     1.80     -    TPro  8.7<H>  /  Alb  4.4  /  TBili  0.6  /  DBili  x   /  AST  43<H>  /  ALT  16  /  AlkPhos  74      CardiacMarkersCARDIAC MARKERS ( 2023 19:18 )  x     / x     / 142 U/L / x     / x          LFTsLIVER FUNCTIONS - ( 2023 19:18 )  Alb: 4.4 g/dL / Pro: 8.7 g/dL / ALK PHOS: 74 U/L / ALT: 16 U/L / AST: 43 U/L / GGT: x           UAUrinalysis Basic - ( 2023 19:18 )    Color: Light Yellow / Appearance: Clear / S.016 / pH: x  Gluc: x / Ketone: Negative  / Bili: Negative / Urobili: <2 mg/dL   Blood: x / Protein: Trace / Nitrite: Negative   Leuk Esterase: Large / RBC: 4 /HPF / WBC 15 /HPF   Sq Epi: x / Non Sq Epi: 11 /HPF / Bacteria: Negative      Radiology:

## 2023-02-28 NOTE — ED PROVIDER NOTE - CLINICAL SUMMARY MEDICAL DECISION MAKING FREE TEXT BOX
87F w/ PMH DM2, HTN, HLD,RA on prednisone and leflunomide, asthma on montelukast, seizure disorder on keppra  p/w recurrent seizures. pt son states she has been having several episodes of unresponsiveness which has been increasing in last few days. pt is currently keflex 500mg q 6 x 7 days Patient with increased frequency of seizures for the last 2 days in the setting of recent use of Keflex for a likely UTI. Son states patient takes Keppra 500 mg twice daily and has had increase in his seizures. Denies prior history of as many seizures in his timeframe. Patient also noted to have increased urination for last few daysPatient will obtain CBC, CMP, UA, Pro-Juan, VBG, CT head, EKG, troponin, likely admission neuro consultPatient without neck stiffness or fever. On time of exam  load w/ keppra

## 2023-02-28 NOTE — ED PROVIDER NOTE - ATTENDING CONTRIBUTION TO CARE
attending wrote note  Dr. Ramirez: I have personally seen and examined this patient at the bedside. I have fully participated in the care of this patient. I have reviewed all pertinent clinical information, including history, physical exam, plan and the Resident's note and agree except as noted. HPI above as by me. PE above as by me. DDX PLAN

## 2023-02-28 NOTE — ED ADULT TRIAGE NOTE - CHIEF COMPLAINT QUOTE
Pt brought in by EMS from home multiple episodes of unresponsiveness this week. As per EMS pts son states the episode lasted 6 mins. Pt complaining of itchiness and unable to sleep.  Pt denies chest pain, sob, n/v/d, fever or chills.

## 2023-02-28 NOTE — H&P ADULT - NSHPPHYSICALEXAM_GEN_ALL_CORE
Vital Signs Last 24 Hrs  T(C): 36.5 (01 Mar 2023 00:04), Max: 37 (28 Feb 2023 17:23)  T(F): 97.7 (01 Mar 2023 00:04), Max: 98.6 (28 Feb 2023 17:23)  HR: 89 (01 Mar 2023 00:04) (75 - 91)  BP: 172/95 (01 Mar 2023 00:04) (128/87 - 190/78)  BP(mean): --  RR: 18 (01 Mar 2023 00:04) (16 - 20)  SpO2: 99% (01 Mar 2023 00:04) (96% - 99%)    Parameters below as of 01 Mar 2023 00:04  Patient On (Oxygen Delivery Method): room air    GENERAL: No acute distress, well-developed  EYES: EOMI, PERRL, conjunctiva and sclera clear  ENT: Neck supple, No JVD, moist mucosa  CHEST/LUNG: Clear to auscultation bilaterally; No wheeze, equal breath sounds bilaterally   BACK: No spinal tenderness, no CVA TTP  HEART: Regular rate and rhythm; No murmurs, rubs, or gallops  ABDOMEN: Soft, Nontender, Nondistended; Bowel sounds present  EXTREMITIES: +Peripheral pulses, No clubbing, cyanosis, or edema  PSYCH: Nl behavior, nl affect  NEUROLOGY: AAOx2-3 (oriented to self, place (does not remember name of hospital), initially confused to time but was able to remember that she came to the ED on Tuesday), non-focal, speech fluent, tongue midline, no facial asymmetry noted, V1-V3 sensation intact to light touch, EOMI/PERRL, shoulder shrug intact b/l, SILT b/l, 5/5 strength b/l  SKIN: +Maculopapular rash on b/l arms and back with multiple areas of excoriations, No lesions seen

## 2023-02-28 NOTE — H&P ADULT - PROBLEM SELECTOR PLAN 1
- Patient with a few episodes of LOC but unclear on cause, did report having palpitations prior to her episodes so higher suspicion for cardiac cause but as per ED note patient's son reported her having seizures  - Would need to clarify LOC episodes with son, left VM for call back  - Neurology evaluation appreciated with check a routine EEG  - Check orthostatics  - Monitor on telemetry  - Monitor for further episodes of LOC  - Fall, aspiration, and seizure precautions

## 2023-02-28 NOTE — H&P ADULT - PROBLEM SELECTOR PLAN 9
DVT ppx - SCDs  Diet - Minced and moist, DASH/CC  Activity - OOB to chair/with assistance, PT eval    Fall and aspiration precautions

## 2023-02-28 NOTE — H&P ADULT - PROBLEM SELECTOR PLAN 4
- Reports having had dysuria and urinary frequency recently but unable to state when, denies any urinary complaints at present  - s/p ceftriaxone in ED, as per ED note was on keflex as outpatient  - Here UA with pyuria but also with some epithelial cells, unclear if patient still with UTI or if pyuria residual from treated UTI  - Given her eosinophilia and new pruritic rash with recent abx use concern that she might be having a drug reaction -> need to clarify timing of rash and abx use with son in AM, for now will hold off on further abx, f/u UCx

## 2023-02-28 NOTE — ED PROVIDER NOTE - NS ED ROS FT
denies fever, chills, chest pain, SOB, abdominal pain, diarrhea, +dysuria, +syncope, bleeding, new rash,weakness, numbness, blurred vision    ROS  otherwise negative as per HPI

## 2023-02-28 NOTE — H&P ADULT - NSHPREVIEWOFSYSTEMS_GEN_ALL_CORE
REVIEW OF SYSTEMS:    CONSTITUTIONAL: No weakness, fevers or chills  EYES: No visual changes or eye discharge  ENT: No rhinorrhea or sore throat  NECK: No pain or stiffness  RESPIRATORY: No cough, wheezing, hemoptysis; No shortness of breath  CARDIOVASCULAR: No chest pain, +palpitations, +LOC; No lower extremity edema  GASTROINTESTINAL: No abdominal or epigastric pain. No nausea, vomiting, or hematemesis; No diarrhea or constipation. No melena or hematochezia.  BACK: No back pain  GENITOURINARY: +dysuria, +frequency, No hematuria  NEUROLOGICAL: No numbness or weakness  SKIN: +itching +rash on arms/thorax, No burning or lesions

## 2023-02-28 NOTE — CONSULT NOTE ADULT - ASSESSMENT
Impression  Episodes of unresponsiveness 2/2 to toxic metabolic etiology ( UTI) vs hx of known seizures   Recommendations:   Continue on home AEDs Keppra 500mg Q12hr   1g load of keppra given in the ED on arrival   rEEG   Correction of UTI as per primary team   ekg   telemetry   orthostatic vital  adequate fluid hydration   correction of electrolytes as needed     Case to be seen with Neurology Attending, Dr. Han.    Patient is a 88yo Rt handed Hungarian speaking F with pmh of DM2, HTN, HLD,RA on prednisone and leflunomide, asthma on montelukast, ?seizure disorder on Keppra 500mg BID presents for episodes of unresponsiveness. History obtained by son, whom states patient has been having increasing episodes of unresponsiveness over the past few days.     Impression  Episodes of unresponsiveness 2/2 to toxic metabolic etiology ( UTI) vs hx of known seizures   Recommendations:   Continue on home AEDs Keppra 500mg Q12hr   1g load of keppra given in the ED on arrival   rEEG   Correction of UTI as per primary team   ekg   telemetry   orthostatic vital  adequate fluid hydration   correction of electrolytes as needed     Case to be seen with Neurology Attending, Dr. Han.    Patient is a 86yo Rt handed Maltese speaking F with pmh of DM2, HTN, HLD,RA on prednisone and leflunomide, asthma on montelukast, ?seizure disorder on Keppra 500mg BID presents for episodes of unresponsiveness. History obtained by son, whom states patient has been having increasing episodes of unresponsiveness over the past few days.     Impression  Episodes of unresponsiveness 2/2 to toxic metabolic etiology ( UTI) vs hx of known seizures   Recommendations:   Continue on home AEDs Keppra 500mg Q12hr   1g load of keppra given in the ED on arrival   rEEG   Correction of UTI as per primary team   ekg   telemetry   orthostatic vital  adequate fluid hydration   correction of electrolytes as needed     Case to be seen with Neurology Attending, Dr. Han.    Patient is a 86yo Rt handed Icelandic speaking F with pmh of DM2, HTN, HLD,RA on prednisone and leflunomide, asthma on montelukast, ?seizure disorder on Keppra 500mg BID presents for episodes of unresponsiveness. History obtained by son, whom states patient has been having increasing episodes of unresponsiveness over the past few days.     Impression  Episodes of unresponsiveness 2/2 to toxic metabolic etiology ( UTI) vs hx of known seizures   Recommendations:   Continue on home AEDs Keppra 500mg Q12hr   1g load of keppra given in the ED on arrival   rEEG   Correction of UTI as per primary team   ekg   telemetry   orthostatic vital  adequate fluid hydration   correction of electrolytes as needed     Case to be seen with Neurology Attending, Dr. Han.    Patient is a 86yo Rt handed Yoruba speaking F with pmh of DM2, HTN, HLD,RA on prednisone and leflunomide, asthma on montelukast, ?seizure disorder on Keppra 500mg BID presents for episodes of unresponsiveness. History obtained by son, whom states patient has been having increasing episodes of unresponsiveness over the past few days.     Impression  Episodes of unresponsiveness 2/2 to toxic metabolic etiology ( UTI) vs hx of known seizures   Recommendations:   Continue on home AEDs Keppra 500mg Q12hr   1g load of keppra given in the ED on arrival   Continuous EEG   Correction of UTI as per primary team   ekg   telemetry   orthostatic vital  adequate fluid hydration   correction of electrolytes as needed     Case to be seen with Neurology Attending, Dr. Han.    Patient is a 88yo Rt handed Divehi speaking F with pmh of DM2, HTN, HLD,RA on prednisone and leflunomide, asthma on montelukast, ?seizure disorder on Keppra 500mg BID presents for episodes of unresponsiveness. History obtained by son, whom states patient has been having increasing episodes of unresponsiveness over the past few days.     Impression  Episodes of unresponsiveness 2/2 to toxic metabolic etiology ( UTI) vs hx of known seizures   Recommendations:   Continue on home AEDs Keppra 500mg Q12hr   1g load of keppra given in the ED on arrival   Continuous EEG   Correction of UTI as per primary team   ekg   telemetry   orthostatic vital  adequate fluid hydration   correction of electrolytes as needed     Case to be seen with Neurology Attending, Dr. Han.    Patient is a 88yo Rt handed Nepali speaking F with pmh of DM2, HTN, HLD,RA on prednisone and leflunomide, asthma on montelukast, ?seizure disorder on Keppra 500mg BID presents for episodes of unresponsiveness. History obtained by son, whom states patient has been having increasing episodes of unresponsiveness over the past few days.     Impression  Episodes of unresponsiveness 2/2 to toxic metabolic etiology ( UTI) vs hx of known seizures   Recommendations:   Continue on home AEDs Keppra 500mg Q12hr   1g load of keppra given in the ED on arrival   Continuous EEG   Correction of UTI as per primary team   ekg   telemetry   orthostatic vital  adequate fluid hydration   correction of electrolytes as needed     Case to be seen with Neurology Attending, Dr. Han.

## 2023-02-28 NOTE — ED ADULT NURSE NOTE - NSIMPLEMENTINTERV_GEN_ALL_ED
Implemented All Fall with Harm Risk Interventions:  [place] to call system. Call bell, personal items and telephone within reach. Instruct patient to call for assistance. Room bathroom lighting operational. Non-slip footwear when patient is off stretcher. Physically safe environment: no spills, clutter or unnecessary equipment. Stretcher in lowest position, wheels locked, appropriate side rails in place. Provide visual cue, wrist band, yellow gown, etc. Monitor gait and stability. Monitor for mental status changes and reorient to person, place, and time. Review medications for side effects contributing to fall risk. Reinforce activity limits and safety measures with patient and family. Provide visual clues: red socks. Implemented All Fall with Harm Risk Interventions:  [place] to call system. Call bell, personal items and telephone within reach. Instruct patient to call for assistance. Room bathroom lighting operational. Non-slip footwear when patient is off stretcher. Physically safe environment: no spills, clutter or unnecessary equipment. Stretcher in lowest position, wheels locked, appropriate side rails in place. Provide visual cue, wrist band, yellow gown, etc. Monitor gait and stability. Monitor for mental status changes and reorient to person, place, and time. Review medications for side effects contributing to fall risk. Reinforce activity limits and safety measures with patient and family. Provide visual clues: red socks. Implemented All Fall with Harm Risk Interventions:  [place] to call system. Call bell, personal items and telephone within reach. Instruct patient to call for assistance. Room bathroom lighting operational. Non-slip footwear when patient is off stretcher. Physically safe environment: no spills, clutter or unnecessary equipment. Stretcher in lowest position, wheels locked, appropriate side rails in place. Provide visual cue, wrist band, yellow gown, etc. Monitor gait and stability. Monitor for mental status changes and reorient to person, place, and time. Review medications for side effects contributing to fall risk. Reinforce activity limits and safety measures with patient and family. Provide visual clues: red socks.

## 2023-02-28 NOTE — ED ADULT TRIAGE NOTE - NS ED NURSE AMBULANCES
[facility]

## 2023-02-28 NOTE — H&P ADULT - NSHPLABSRESULTS_GEN_ALL_CORE
LABS and ADDITIONAL STUDIES: LABS and ADDITIONAL STUDIES:                        11.3   5.84  )-----------( 180      ( 2023 19:18 )             37.8   Complete Blood Count + Automated Diff (23 @ 19:18)   Auto Eosinophil #: 1.41 K/uL   Auto Eosinophil %: 24.1 %   Complete Blood Count + Automated Diff (23 @ 19:18)   Auto Monocyte #: 1.51 K/uL   Auto Monocyte %: 25.9 %         140  |  104  |  19  ----------------------------<  92  4.8   |  25  |  1.00    Ca    10.0      2023 19:18  Mg     1.80         TPro  8.7<H>  /  Alb  4.4  /  TBili  0.6  /  DBili  x   /  AST  43<H>  /  ALT  16  /  AlkPhos  74      CARDIAC MARKERS ( 2023 19:18 )  x     / x     / 142 U/L / x     / x      Troponin T, High Sensitivity (23 @ 19:18)   Troponin T, High Sensitivity Result: 25 ng/L   Troponin T, High Sensitivity (23 @ 23:00)   Troponin T, High Sensitivity Result: 28  ng/L   Serum Pro-Brain Natriuretic Peptide (23 @ 19:18)   Serum Pro-Brain Natriuretic Peptide: 399 pg/mL     Procalcitonin, Serum (23 @ 19:18)   Procalcitonin, Serum: 0.06  Thyroid Stimulating Hormone, Serum (23 @ 19:18)   Thyroid Stimulating Hormone, Serum: 1.72 uIU/mL   Lipase, Serum (23 @ 19:18)   Lipase, Serum: 87 U/L     LIVER FUNCTIONS - ( 2023 19:18 )  Alb: 4.4 g/dL / Pro: 8.7 g/dL / ALK PHOS: 74 U/L / ALT: 16 U/L / AST: 43 U/L / GGT: x           Urinalysis Basic - ( 2023 19:18 )  Color: Light Yellow / Appearance: Clear / S.016 / pH: x  Gluc: x / Ketone: Negative  / Bili: Negative / Urobili: <2 mg/dL   Blood: x / Protein: Trace / Nitrite: Negative   Leuk Esterase: Large / RBC: 4 /HPF / WBC 15 /HPF   Sq Epi: x / Non Sq Epi: 11 /HPF / Bacteria: Negative    Blood Gas Venous - Lactate (23 @ 19:18)   Blood Gas Venous - Lactate: 1.2 mmol/L     < from: CT Head No Cont (23 @ 23:16) >  IMPRESSION:  No acute intracranial hemorrhage, mass effect, or evidence of acute vascular territorial infarction. If clinical symptoms persist or worsen, more sensitive evaluation with brain MRI may be obtained, if no contraindications exist.  --- End of Report ---  < end of copied text >    < from: Xray Chest 1 View- PORTABLE-Urgent (23 @ 21:36) >  ******PRELIMINARY REPORT******    IMPRESSION:  No focal consolidation, pneumothorax, or pleural effusion.  < end of copied text >    EKG - NSR with sinus arrythmia, rate 82, QTc 453, no significant ST-T wave changes

## 2023-02-28 NOTE — H&P ADULT - HISTORY OF PRESENT ILLNESS
This is an 87F with history of DM2, HTN, HLD, Rheumatoid Arthritis on Prednisone and Leflunomide, Asthma, and Seizure d/o who presents to the hospital with complaints of syncope. Patient said that she has had 2 episodes over the past few days. Most recent one was earlier today, said that she was on the toilet when she started to feel dizzy, asked her son to take her to the couch and then passed out on the couch. She does not remember how long she was out for but reports having some NBNB emesis after waking up. Patient reported some palpitations with the dizziness but otherwise no chest pain. Also reports a pruritic rash over her arms, chest, and back but cannot state how long the rash has been present for. States that she was told to take a medication for a few days for the rash but does not know the name of the medication. Also states that she had some dysuria and urinary frequency recently but currently denies any urinary complaints, no hematuria. No fevers/diaphoresis but does report chills. No other acute complaints.     Attempted to reach patient's son Wesley at [phone] but there as no . Left  for call back. As per ED documentation, the son reported to them that the patient had been having increased episodes of breakthrough seizures. Said that the most recent episode was this afternoon from 3-330 PM and consisted of the patient not responding.     On arrival to the ED, her vitals were T 98.6, P 88, /82, RR 18, O2 sat 96% RA. Her lab work was significant for eosinophilia, stable trops, and pyuria. Her imaging did not show any acute findings. She was given NS 1L, losartan 100mg PO x1, keppra 1000mg IVPB x1, and ceftriaxone 1g. She was admitted to medicine on telemetry.  This is an 87F with history of DM2, HTN, HLD, Rheumatoid Arthritis on Prednisone and Leflunomide, Asthma, and Seizure d/o who presents to the hospital with complaints of syncope. Patient said that she has had 2 episodes over the past few days. Most recent one was earlier today, said that she was on the toilet when she started to feel dizzy, asked her son to take her to the couch and then passed out on the couch. She does not remember how long she was out for but reports having some NBNB emesis after waking up. Patient reported some palpitations with the dizziness but otherwise no chest pain. Also reports a pruritic rash over her arms, chest, and back but cannot state how long the rash has been present for. States that she was told to take a medication for a few days for the rash but does not know the name of the medication. Also states that she had some dysuria and urinary frequency recently but currently denies any urinary complaints, no hematuria. No fevers/diaphoresis but does report chills. No other acute complaints.     Attempted to reach patient's son Wesley at [phone] but there as no . Left  for call back. As per ED documentation, the son reported to them that the patient had been having increased episodes of breakthrough seizures. Said that the most recent episode was this afternoon from 3-330 PM and consisted of the patient not responding.     On arrival to the ED, her vitals were T 98.6, P 88, /82, RR 18, O2 sat 96% RA. Her lab work was significant for eosinophilia, stable trops, and pyuria. Her imaging did not show any acute findings. She was given NS 1L, losartan 100mg PO x1, keppra 1000mg IVPB x1, and ceftriaxone 1g. She was admitted to medicine on telemetry.  This is an 87F with history of DM2, HTN, HLD, Rheumatoid Arthritis on Prednisone and Leflunomide, Asthma, and Seizure d/o who presents to the hospital with complaints of syncope. Patient said that she has had 2 episodes over the past few days. Most recent one was earlier today, said that she was on the toilet when she started to feel dizzy, asked her son to take her to the couch and then passed out on the couch. She does not remember how long she was out for but reports having some NBNB emesis after waking up. Patient reported some palpitations with the dizziness but otherwise no chest pain. Also reports a pruritic rash over her arms, chest, and back but cannot state how long the rash has been present for. States that she was told to take a medication for a few days for the rash but does not know the name of the medication. Also states that she had some dysuria and urinary frequency recently but currently denies any urinary complaints, no hematuria. No fevers/diaphoresis but does report chills. No other acute complaints.     Attempted to reach patient's son Wesley at [phone] but there as no . Left  for call back. As per ED documentation, the son reported to them that the patient had been having increased episodes of breakthrough seizures. Said that the most recent episode was this afternoon from 3-330 PM and consisted of the patient not responding.     On arrival to the ED, her vitals were T 98.6, P 88, /82, RR 18, O2 sat 96% RA. Her lab work was significant for eosinophilia, stable trops, and pyuria. Her imaging did not show any acute findings. She was given NS 1L, losartan 100mg PO x1, keppra 1000mg IVPB x1, and ceftriaxone 1g. She was admitted to medicine on telemetry.  This is an 87F with history of DM2, HTN, HLD, Rheumatoid Arthritis on Prednisone and Leflunomide, Asthma, and Seizure d/o who presents to the hospital with complaints of syncope. Patient said that she has had 2 episodes over the past few days. Most recent one was earlier today, said that she was on the toilet when she started to feel dizzy, asked her son to take her to the couch and then passed out on the couch. She does not remember how long she was out for but reports having some NBNB emesis after waking up. Denies any falls or head strike. Patient reported some palpitations with the dizziness but otherwise no chest pain. Also reports a pruritic rash over her arms, chest, and back but cannot state how long the rash has been present for. States that she was told to take a medication for a few days for the rash but does not know the name of the medication. Also states that she had some dysuria and urinary frequency recently but currently denies any urinary complaints, no hematuria. No fevers/diaphoresis but does report chills. No other acute complaints.     Attempted to reach patient's son Wesley at [phone] but there as no . Left  for call back. As per ED documentation, the son reported to them that the patient had been having increased episodes of breakthrough seizures. Said that the most recent episode was this afternoon from 3-330 PM and consisted of the patient not responding.     On arrival to the ED, her vitals were T 98.6, P 88, /82, RR 18, O2 sat 96% RA. Her lab work was significant for eosinophilia, stable trops, and pyuria. Her imaging did not show any acute findings. She was given NS 1L, losartan 100mg PO x1, keppra 1000mg IVPB x1, and ceftriaxone 1g. She was admitted to medicine on telemetry.  This is an 87F with history of DM2, HTN, HLD, Rheumatoid Arthritis on Prednisone and Leflunomide, Asthma, and Seizure d/o who presents to the hospital with complaints of syncope. Patient said that she has had 2 episodes over the past few days. Most recent one was earlier today, said that she was on the toilet when she started to feel dizzy, asked her son to take her to the couch and then passed out on the couch. She does not remember how long she was out for but reports having some NBNB emesis after waking up. Denies any falls or head strike. Patient reported some palpitations with the dizziness but otherwise no chest pain. Also reports a pruritic rash over her arms, chest, and back but cannot state how long the rash has been present for. States that she was told to take a medication for a few days for the rash but does not know the name of the medication. Also states that she had some dysuria and urinary frequency recently but currently denies any urinary complaints, no hematuria. No fevers/diaphoresis but does report chills. No other acute complaints.     Attempted to reach patient's son Wesley at [phone] but there as no . Left  for call back. As per ED documentation, the son reported to them that the patient had been having increased episodes of breakthrough seizures. Said that the most recent episode was this afternoon from 3-330 PM and consisted of the patient not responding.     On arrival to the ED, her vitals were T 98.6, P 88, /82, RR 18, O2 sat 96% RA. Her lab work was significant for eosinophilia, stable trops, and pyuria. Her imaging did not show any acute findings. She was given NS 1L, losartan 100mg PO x1, keppra 1000mg IVPB x1, and ceftriaxone 1g. She was admitted to medicine on telemetry.  This is an 87F with history of DM2, HTN, HLD, Rheumatoid Arthritis on Prednisone and Leflunomide, Asthma, and Seizure d/o who presents to the hospital with complaints of syncope. Patient said that she has had 2 episodes over the past few days. Most recent one was earlier today, said that she was on the toilet when she started to feel dizzy, asked her son to take her to the couch and then passed out on the couch. She does not remember how long she was out for but reports having some NBNB emesis after waking up. Denies any falls or head strike. Patient reported some palpitations with the dizziness but otherwise no chest pain. Also reports a pruritic rash over her arms, chest, and back but cannot state how long the rash has been present for. States that she was told to take a medication for a few days for the rash but does not know the name of the medication. Also states that she had some dysuria and urinary frequency recently but currently denies any urinary complaints, no hematuria. No fevers/diaphoresis but does report chills. No other acute complaints.     Attempted to reach patient's son Wesley at [phone] but there as no . Left  for call back. As per ED documentation, the son reported to them that the patient had been having increased episodes of breakthrough seizures. Said that the most recent episode was this afternoon from 3-330 PM and consisted of the patient not responding.     On arrival to the ED, her vitals were T 98.6, P 88, /82, RR 18, O2 sat 96% RA. Her lab work was significant for eosinophilia, stable trops, and pyuria. Her imaging did not show any acute findings. She was given NS 1L, losartan 100mg PO x1, keppra 1000mg IVPB x1, and ceftriaxone 1g. She was admitted to medicine on telemetry.  This is an 87F with history of DM2, HTN, HLD, Rheumatoid Arthritis on Prednisone and Leflunomide, Asthma, and Seizure d/o who presents to the hospital with complaints of syncope. Patient said that she has had 2 episodes over the past few days. Most recent one was earlier today, said that she was on the toilet when she started to feel dizzy, asked her son to take her to the couch and then passed out on the couch. She does not remember how long she was out for but reports having some NBNB emesis after waking up. Denies any falls or head strike. Patient reported some palpitations with the dizziness but otherwise no chest pain. Also reports a pruritic rash over her arms, chest, and back but cannot state how long the rash has been present for. States that she was told to take a medication for a few days for the rash but does not know the name of the medication. Also states that she had some dysuria and urinary frequency recently but currently denies any urinary complaints, no hematuria. No fevers/diaphoresis but does report chills. No other acute complaints.     Attempted to reach patient's son Wesley at [phone] but there as no . Left  for call back. As per ED documentation, the son reported to them that the patient had been having increased episodes of breakthrough seizures. Said that the most recent episode was this afternoon from 3-330 PM and consisted of the patient not responding. -> Spoke with son Wesley around 750AM on 3/1, said that patient has a history of seizures where she tenses up and stares off but no generalized tonic clonic episodes. He states that his brother was with the patient recently and therefore he did not witness the episodes but states that he was told by his brother the patient was unresponsive, unclear if she was rigid or not, no tonic-clonic activity, no tongue biting, no urinary/fecal incontinence. Said that patient had a recent seizure episode on Saturday and then and this episode yesterday, usually her seizre episodes are months apart. Due to the relative closeness of the episodes they brought her to the hospital. He also said that the patient was recently started on tramadol and developed a rash from the medication. That was stopped but the rash has persisted and the patient has not been able to sleep well due to the rash/pruritis. States that the patient missed her AM medications yesterday due to her poor sleep and believes she might have had a seizure due to the missed medication. He said that the patient gets her medications from [facility] on [facility].     On arrival to the ED, her vitals were T 98.6, P 88, /82, RR 18, O2 sat 96% RA. Her lab work was significant for eosinophilia, stable trops, and pyuria. Her imaging did not show any acute findings. She was given NS 1L, losartan 100mg PO x1, keppra 1000mg IVPB x1, and ceftriaxone 1g. She was admitted to medicine on telemetry.  This is an 87F with history of DM2, HTN, HLD, Rheumatoid Arthritis on Prednisone and Leflunomide, Asthma, and Seizure d/o who presents to the hospital with complaints of syncope. Patient said that she has had 2 episodes over the past few days. Most recent one was earlier today, said that she was on the toilet when she started to feel dizzy, asked her son to take her to the couch and then passed out on the couch. She does not remember how long she was out for but reports having some NBNB emesis after waking up. Denies any falls or head strike. Patient reported some palpitations with the dizziness but otherwise no chest pain. Also reports a pruritic rash over her arms, chest, and back but cannot state how long the rash has been present for. States that she was told to take a medication for a few days for the rash but does not know the name of the medication. Also states that she had some dysuria and urinary frequency recently but currently denies any urinary complaints, no hematuria. No fevers/diaphoresis but does report chills. No other acute complaints.     Attempted to reach patient's son Wesley at [phone] but there as no . Left  for call back. As per ED documentation, the son reported to them that the patient had been having increased episodes of breakthrough seizures. Said that the most recent episode was this afternoon from 3-330 PM and consisted of the patient not responding. -> Spoke with son Wesley around 750AM on 3/1, said that patient has a history of seizures where she tenses up and stares off but no generalized tonic clonic episodes. He states that his brother was with the patient recently and therefore he did not witness the episodes but states that he was told by his brother the patient was unresponsive, unclear if she was rigid or not, no tonic-clonic activity, no tongue biting, no urinary/fecal incontinence. Said that patient had a recent seizure episode on Saturday and then and this episode yesterday, usually her seizre episodes are months apart. Due to the relative closeness of the episodes they brought her to the hospital. He also said that the patient was recently started on tramadol and developed a rash from the medication. That was stopped but the rash has persisted and the patient has not been able to sleep well due to the rash/pruritis. States that the patient missed her AM medications yesterday due to her poor sleep and believes she might have had a seizure due to the missed medication. He said that the patient gets her medications from [facility] on [facility].     On arrival to the ED, her vitals were T 98.6, P 88, /82, RR 18, O2 sat 96% RA. Her lab work was significant for eosinophilia, stable trops, and pyuria. Her imaging did not show any acute findings. She was given NS 1L, losartan 100mg PO x1, keppra 1000mg IVPB x1, and ceftriaxone 1g. She was admitted to medicine on telemetry.  This is an 87F with history of DM2, HTN, HLD, Rheumatoid Arthritis on Prednisone and Leflunomide, Asthma, and Seizure d/o who presents to the hospital with complaints of syncope. Patient said that she has had 2 episodes over the past few days. Most recent one was earlier today, said that she was on the toilet when she started to feel dizzy, asked her son to take her to the couch and then passed out on the couch. She does not remember how long she was out for but reports having some NBNB emesis after waking up. Denies any falls or head strike. Patient reported some palpitations with the dizziness but otherwise no chest pain. Also reports a pruritic rash over her arms, chest, and back but cannot state how long the rash has been present for. States that she was told to take a medication for a few days for the rash but does not know the name of the medication. Also states that she had some dysuria and urinary frequency recently but currently denies any urinary complaints, no hematuria. No fevers/diaphoresis but does report chills. No other acute complaints.     Attempted to reach patient's son Wesley at [phone] but there as no . Left  for call back. As per ED documentation, the son reported to them that the patient had been having increased episodes of breakthrough seizures. Said that the most recent episode was this afternoon from 3-330 PM and consisted of the patient not responding. -> Spoke with son Wesley around 750AM on 3/1, said that patient has a history of seizures where she tenses up and stares off but no generalized tonic clonic episodes. He states that his brother was with the patient recently and therefore he did not witness the episodes but states that he was told by his brother the patient was unresponsive, unclear if she was rigid or not, no tonic-clonic activity, no tongue biting, no urinary/fecal incontinence. Said that patient had a recent seizure episode on Saturday and then and this episode yesterday, usually her seizre episodes are months apart. Due to the relative closeness of the episodes they brought her to the hospital. He also said that the patient was recently started on tramadol and developed a rash from the medication. That was stopped but the rash has persisted and the patient has not been able to sleep well due to the rash/pruritis. States that the patient missed her AM medications yesterday due to her poor sleep and believes she might have had a seizure due to the missed medication. He said that the patient gets her medications from [facility] on [facility] [place], [facility] by [facility].     On arrival to the ED, her vitals were T 98.6, P 88, /82, RR 18, O2 sat 96% RA. Her lab work was significant for eosinophilia, stable trops, and pyuria. Her imaging did not show any acute findings. She was given NS 1L, losartan 100mg PO x1, keppra 1000mg IVPB x1, and ceftriaxone 1g. She was admitted to medicine on telemetry.

## 2023-02-28 NOTE — ED PROVIDER NOTE - PROGRESS NOTE DETAILS
PREETI Gore (PGY-3) - pt w/ possible seizure like activity at home, here found to have negative lactate and CK, UTI on ua, will admit. neuro is consulted for eeg.

## 2023-02-28 NOTE — ED PROVIDER NOTE - NSICDXPASTMEDICALHX_GEN_ALL_CORE_FT
PAST MEDICAL HISTORY:  DM (diabetes mellitus)     History of seizure disorder     Rheumatoid disease

## 2023-02-28 NOTE — ED PROVIDER NOTE - PHYSICAL EXAMINATION
Gen: Awake, Alert, WD, WN, NAD  Head:  NC/AT  Eyes:  PERRL, EOMI, Conjunctiva pink, lids normal, no scleral icterus  ENT: OP clear, no exudates, no erythema, uvula midline, TMs clear bilaterally, moist mucus membranes  Neck: supple, nontender, no meningismus, no JVD, trachea midline  Cardiac/CV:  S1 S2, RRR, no M/G/R  Respiratory/Pulm:  CTAB, good air movement, normal resp effort, no wheezes/stridor/retractions/rales/rhonchi  Gastrointestinal/Abdomen:  Soft, nontender, nondistended, +BS, no rebound/guarding  Back:  no CVAT, no MLT  Ext:  warm, well perfused, moving all extremities spontaneously, no peripheral edema, distal pulses intact  Skin: intact, no rash  Neuro:  AAOx2, sensation intact, motor 5/5 x 4 extremities, speech clear

## 2023-02-28 NOTE — ED ADULT NURSE NOTE - OBJECTIVE STATEMENT
Break RN- received pt in TRA, 87 yr/o female A+OX2 to self and place, ambulatory with assistance. Scottish speaking able to make needs known in english. PMH of DMII, HTN, HLD, RA, and seizure on Keppra. pt was brought into ED by EMS for multiple episode of unresponsiveness. unable to determine if patient is compliant with medications. denies chest pain and SOB, RR even and unlabored. right AC 20g placed, labs drawn and sent. meds given as ordered. pt is stable at this time. Break RN- received pt in TRA, 87 yr/o female A+OX2 to self and place, ambulatory with assistance. Norwegian speaking able to make needs known in english. PMH of DMII, HTN, HLD, RA, and seizure on Keppra. pt was brought into ED by EMS for multiple episode of unresponsiveness. unable to determine if patient is compliant with medications. denies chest pain and SOB, RR even and unlabored. right AC 20g placed, labs drawn and sent. meds given as ordered. pt is stable at this time. Break RN- received pt in TRA, 87 yr/o female A+OX2 to self and place, ambulatory with assistance. Sammarinese speaking able to make needs known in english. PMH of DMII, HTN, HLD, RA, and seizure on Keppra. pt was brought into ED by EMS for multiple episode of unresponsiveness. unable to determine if patient is compliant with medications. denies chest pain and SOB, RR even and unlabored. right AC 20g placed, labs drawn and sent. meds given as ordered. pt is stable at this time.

## 2023-02-28 NOTE — ED PROVIDER NOTE - OBJECTIVE STATEMENT
87F w/ PMH DM2, HTN, HLD,RA on prednisone and leflunomide, asthma on montelukast, seizure disorder on keppra  p/w recurrent seizures. pt son states she has been having several episodes of unresponsiveness which has been increasing in last few days. pt is currently keflex 500mg q 6 x 7 days  , losartan 100mg , aspirin 81mg prn , keppra 500mg BID , prednisone 5mg daily, , januvia 50mg daily. Pt son states she had her first seizure 2 days prior , and 1 seizure today which was from 3- 330.   primary care : Dr. lico mobley  [phone]   pt states she has been more tired and urinating. Hx obtained from son which states she has been having increased urination and increased frequency of seizures in last 2-3 days. pt currently on keflex.   pt denies chest pain nausea, vomiting 87F w/ PMH DM2, HTN, HLD,RA on prednisone and leflunomide, asthma on montelukast, seizure disorder on keppra  p/w recurrent seizures. pt son states she has been having several episodes of unresponsiveness which has been increasing in last few days. pt is currently keflex 500mg q 6 x 7 days  , losartan 100mg , aspirin 81mg prn , keppra 500mg BID , prednisone 5mg daily, , januvia 50mg daily. Pt son states she had her first seizure 2 days prior , and 1 seizure today which was from 3- 330.   primary care : Dr. lico mobley  [phone]   pt states she has been more tired and urinating. Hx obtained from son which states she has been having increased urination and increased frequency of seizures in last 2-3 days. pt currently on keflex.   pt denies chest pain nausea, vomiting 87F w/ PMH DM2, HTN, HLD,RA on prednisone and leflunomide, asthma on montelukast, seizure disorder on keppra  p/w recurrent seizures. pt son states she has been having several episodes of unresponsiveness which has been increasing in last few days. pt is currently keflex 500mg q 6 x 7 days  , losartan 100mg , aspirin 81mg prn , keppra 500mg BID , prednisone 5mg daily, , januvia 50mg daily. Pt son states she had her first seizure 2 days prior , and 1 seizure today which was from 3- 330.   primary care : Dr. lico mobley  [phone]   pt states she has been more tired and urinating. Hx obtained from son which states she has been having increased urination and increased frequency of seizures in last 2-3 days. pt currently on keflex.   pt denies chest pain nausea, vomiting

## 2023-02-28 NOTE — H&P ADULT - PROBLEM SELECTOR PLAN 8
- Patient unsure of her medications, reconciled as per SureScripts and ED note, med history pharmacist emailed for help in her medication reconciliation

## 2023-02-28 NOTE — H&P ADULT - PROBLEM SELECTOR PLAN 2
- c/w home keppra 500mg BID  - obtain routine EEG, seizure precautions  - Clarify LOC episodes with son  - f/u further neurology recommendations - c/w home keppra 500mg BID  - obtain routine EEG, seizure precautions  - f/u further neurology recommendations

## 2023-02-28 NOTE — H&P ADULT - PROBLEM SELECTOR PLAN 3
- New onset of an intensely pruritic maculopapular rash, noted to have significant excoriations on her back and arms  - Unclear on time frame of rash, unclear on causation of rash  - Clarify timing of rash and any new medications with patient's son in AM  - Patient's eosinophilia likely 2/2 rash, would trend for now  - Will place patient on calamine lotion for now - New onset of an intensely pruritic maculopapular rash, noted to have significant excoriations on her back and arms  - Unclear on time frame of rash, unclear on causation of rash (possibly 2/2 tramadol as per son but unknown timing of rash)  - Clarify timing of rash and any new medications with patient's son in AM  - Patient's eosinophilia likely 2/2 rash, would trend for now  - Will place patient on calamine lotion for now

## 2023-03-01 DIAGNOSIS — Z98.49 CATARACT EXTRACTION STATUS, UNSPECIFIED EYE: Chronic | ICD-10-CM

## 2023-03-01 DIAGNOSIS — R21 RASH AND OTHER NONSPECIFIC SKIN ERUPTION: ICD-10-CM

## 2023-03-01 DIAGNOSIS — E11.9 TYPE 2 DIABETES MELLITUS WITHOUT COMPLICATIONS: ICD-10-CM

## 2023-03-01 DIAGNOSIS — M06.9 RHEUMATOID ARTHRITIS, UNSPECIFIED: ICD-10-CM

## 2023-03-01 DIAGNOSIS — Z29.9 ENCOUNTER FOR PROPHYLACTIC MEASURES, UNSPECIFIED: ICD-10-CM

## 2023-03-01 DIAGNOSIS — G40.909 EPILEPSY, UNSPECIFIED, NOT INTRACTABLE, WITHOUT STATUS EPILEPTICUS: ICD-10-CM

## 2023-03-01 DIAGNOSIS — I10 ESSENTIAL (PRIMARY) HYPERTENSION: ICD-10-CM

## 2023-03-01 DIAGNOSIS — J45.909 UNSPECIFIED ASTHMA, UNCOMPLICATED: ICD-10-CM

## 2023-03-01 DIAGNOSIS — N39.0 URINARY TRACT INFECTION, SITE NOT SPECIFIED: ICD-10-CM

## 2023-03-01 DIAGNOSIS — R55 SYNCOPE AND COLLAPSE: ICD-10-CM

## 2023-03-01 LAB
A1C WITH ESTIMATED AVERAGE GLUCOSE RESULT: 6 % — HIGH (ref 4–5.6)
ALBUMIN SERPL ELPH-MCNC: 4.1 G/DL — SIGNIFICANT CHANGE UP (ref 3.3–5)
ALP SERPL-CCNC: 70 U/L — SIGNIFICANT CHANGE UP (ref 40–120)
ALT FLD-CCNC: 16 U/L — SIGNIFICANT CHANGE UP (ref 4–33)
ANION GAP SERPL CALC-SCNC: 13 MMOL/L — SIGNIFICANT CHANGE UP (ref 7–14)
AST SERPL-CCNC: 32 U/L — SIGNIFICANT CHANGE UP (ref 4–32)
BASOPHILS # BLD AUTO: 0.15 K/UL — SIGNIFICANT CHANGE UP (ref 0–0.2)
BASOPHILS NFR BLD AUTO: 2.7 % — HIGH (ref 0–2)
BILIRUB SERPL-MCNC: 0.6 MG/DL — SIGNIFICANT CHANGE UP (ref 0.2–1.2)
BUN SERPL-MCNC: 16 MG/DL — SIGNIFICANT CHANGE UP (ref 7–23)
CALCIUM SERPL-MCNC: 9.7 MG/DL — SIGNIFICANT CHANGE UP (ref 8.4–10.5)
CHLORIDE SERPL-SCNC: 106 MMOL/L — SIGNIFICANT CHANGE UP (ref 98–107)
CO2 SERPL-SCNC: 24 MMOL/L — SIGNIFICANT CHANGE UP (ref 22–31)
CREAT SERPL-MCNC: 0.86 MG/DL — SIGNIFICANT CHANGE UP (ref 0.5–1.3)
CULTURE RESULTS: SIGNIFICANT CHANGE UP
EGFR: 66 ML/MIN/1.73M2 — SIGNIFICANT CHANGE UP
EOSINOPHIL # BLD AUTO: 1.33 K/UL — HIGH (ref 0–0.5)
EOSINOPHIL NFR BLD AUTO: 23.8 % — HIGH (ref 0–6)
ESTIMATED AVERAGE GLUCOSE: 126 — SIGNIFICANT CHANGE UP
GLUCOSE BLDC GLUCOMTR-MCNC: 106 MG/DL — HIGH (ref 70–99)
GLUCOSE BLDC GLUCOMTR-MCNC: 107 MG/DL — HIGH (ref 70–99)
GLUCOSE BLDC GLUCOMTR-MCNC: 88 MG/DL — SIGNIFICANT CHANGE UP (ref 70–99)
GLUCOSE BLDC GLUCOMTR-MCNC: 94 MG/DL — SIGNIFICANT CHANGE UP (ref 70–99)
GLUCOSE SERPL-MCNC: 93 MG/DL — SIGNIFICANT CHANGE UP (ref 70–99)
HCT VFR BLD CALC: 38.2 % — SIGNIFICANT CHANGE UP (ref 34.5–45)
HGB BLD-MCNC: 11.4 G/DL — LOW (ref 11.5–15.5)
IANC: 1.45 K/UL — LOW (ref 1.8–7.4)
IMM GRANULOCYTES NFR BLD AUTO: 0.2 % — SIGNIFICANT CHANGE UP (ref 0–0.9)
LYMPHOCYTES # BLD AUTO: 1.05 K/UL — SIGNIFICANT CHANGE UP (ref 1–3.3)
LYMPHOCYTES # BLD AUTO: 18.8 % — SIGNIFICANT CHANGE UP (ref 13–44)
MAGNESIUM SERPL-MCNC: 1.7 MG/DL — SIGNIFICANT CHANGE UP (ref 1.6–2.6)
MCHC RBC-ENTMCNC: 28.6 PG — SIGNIFICANT CHANGE UP (ref 27–34)
MCHC RBC-ENTMCNC: 29.8 GM/DL — LOW (ref 32–36)
MCV RBC AUTO: 95.7 FL — SIGNIFICANT CHANGE UP (ref 80–100)
MONOCYTES # BLD AUTO: 1.61 K/UL — HIGH (ref 0–0.9)
MONOCYTES NFR BLD AUTO: 28.8 % — HIGH (ref 2–14)
NEUTROPHILS # BLD AUTO: 1.45 K/UL — LOW (ref 1.8–7.4)
NEUTROPHILS NFR BLD AUTO: 25.7 % — LOW (ref 43–77)
NRBC # BLD: 0 /100 WBCS — SIGNIFICANT CHANGE UP (ref 0–0)
NRBC # FLD: 0 K/UL — SIGNIFICANT CHANGE UP (ref 0–0)
PHOSPHATE SERPL-MCNC: 2.9 MG/DL — SIGNIFICANT CHANGE UP (ref 2.5–4.5)
PLATELET # BLD AUTO: 174 K/UL — SIGNIFICANT CHANGE UP (ref 150–400)
POTASSIUM SERPL-MCNC: 3.8 MMOL/L — SIGNIFICANT CHANGE UP (ref 3.5–5.3)
POTASSIUM SERPL-SCNC: 3.8 MMOL/L — SIGNIFICANT CHANGE UP (ref 3.5–5.3)
PROT SERPL-MCNC: 8.1 G/DL — SIGNIFICANT CHANGE UP (ref 6–8.3)
RBC # BLD: 3.99 M/UL — SIGNIFICANT CHANGE UP (ref 3.8–5.2)
RBC # FLD: 17.4 % — HIGH (ref 10.3–14.5)
SODIUM SERPL-SCNC: 143 MMOL/L — SIGNIFICANT CHANGE UP (ref 135–145)
SPECIMEN SOURCE: SIGNIFICANT CHANGE UP
TROPONIN T, HIGH SENSITIVITY RESULT: 37 NG/L — SIGNIFICANT CHANGE UP
WBC # BLD: 5.6 K/UL — SIGNIFICANT CHANGE UP (ref 3.8–10.5)
WBC # FLD AUTO: 5.6 K/UL — SIGNIFICANT CHANGE UP (ref 3.8–10.5)

## 2023-03-01 PROCEDURE — 99223 1ST HOSP IP/OBS HIGH 75: CPT

## 2023-03-01 PROCEDURE — 95720 EEG PHY/QHP EA INCR W/VEEG: CPT

## 2023-03-01 PROCEDURE — 99233 SBSQ HOSP IP/OBS HIGH 50: CPT

## 2023-03-01 RX ORDER — PERMETHRIN CREAM 5% W/W 50 MG/G
1 CREAM TOPICAL ONCE
Refills: 0 | Status: COMPLETED | OUTPATIENT
Start: 2023-03-01 | End: 2023-03-01

## 2023-03-01 RX ORDER — ONDANSETRON 8 MG/1
4 TABLET, FILM COATED ORAL EVERY 8 HOURS
Refills: 0 | Status: DISCONTINUED | OUTPATIENT
Start: 2023-03-01 | End: 2023-03-03

## 2023-03-01 RX ORDER — MONTELUKAST 4 MG/1
10 TABLET, CHEWABLE ORAL DAILY
Refills: 0 | Status: DISCONTINUED | OUTPATIENT
Start: 2023-03-01 | End: 2023-03-03

## 2023-03-01 RX ORDER — INFLUENZA VIRUS VACCINE 15; 15; 15; 15 UG/.5ML; UG/.5ML; UG/.5ML; UG/.5ML
0.7 SUSPENSION INTRAMUSCULAR ONCE
Refills: 0 | Status: DISCONTINUED | OUTPATIENT
Start: 2023-03-01 | End: 2023-03-03

## 2023-03-01 RX ORDER — INSULIN LISPRO 100/ML
VIAL (ML) SUBCUTANEOUS
Refills: 0 | Status: DISCONTINUED | OUTPATIENT
Start: 2023-03-01 | End: 2023-03-03

## 2023-03-01 RX ORDER — DEXTROSE 50 % IN WATER 50 %
25 SYRINGE (ML) INTRAVENOUS ONCE
Refills: 0 | Status: DISCONTINUED | OUTPATIENT
Start: 2023-03-01 | End: 2023-03-03

## 2023-03-01 RX ORDER — INSULIN LISPRO 100/ML
VIAL (ML) SUBCUTANEOUS AT BEDTIME
Refills: 0 | Status: DISCONTINUED | OUTPATIENT
Start: 2023-03-01 | End: 2023-03-03

## 2023-03-01 RX ORDER — ACETAMINOPHEN 500 MG
650 TABLET ORAL EVERY 6 HOURS
Refills: 0 | Status: DISCONTINUED | OUTPATIENT
Start: 2023-03-01 | End: 2023-03-02

## 2023-03-01 RX ORDER — SODIUM CHLORIDE 9 MG/ML
1000 INJECTION, SOLUTION INTRAVENOUS
Refills: 0 | Status: DISCONTINUED | OUTPATIENT
Start: 2023-03-01 | End: 2023-03-03

## 2023-03-01 RX ORDER — BUDESONIDE AND FORMOTEROL FUMARATE DIHYDRATE 160; 4.5 UG/1; UG/1
2 AEROSOL RESPIRATORY (INHALATION)
Refills: 0 | Status: DISCONTINUED | OUTPATIENT
Start: 2023-03-01 | End: 2023-03-03

## 2023-03-01 RX ORDER — LEVETIRACETAM 250 MG/1
500 TABLET, FILM COATED ORAL
Refills: 0 | Status: DISCONTINUED | OUTPATIENT
Start: 2023-03-01 | End: 2023-03-03

## 2023-03-01 RX ORDER — HYDROCORTISONE 1 %
1 OINTMENT (GRAM) TOPICAL
Refills: 0 | Status: DISCONTINUED | OUTPATIENT
Start: 2023-03-01 | End: 2023-03-03

## 2023-03-01 RX ORDER — LORATADINE 10 MG/1
10 TABLET ORAL
Refills: 0 | Status: DISCONTINUED | OUTPATIENT
Start: 2023-03-01 | End: 2023-03-03

## 2023-03-01 RX ORDER — GLUCAGON INJECTION, SOLUTION 0.5 MG/.1ML
1 INJECTION, SOLUTION SUBCUTANEOUS ONCE
Refills: 0 | Status: DISCONTINUED | OUTPATIENT
Start: 2023-03-01 | End: 2023-03-03

## 2023-03-01 RX ORDER — LANOLIN ALCOHOL/MO/W.PET/CERES
3 CREAM (GRAM) TOPICAL AT BEDTIME
Refills: 0 | Status: DISCONTINUED | OUTPATIENT
Start: 2023-03-01 | End: 2023-03-03

## 2023-03-01 RX ORDER — DEXTROSE 50 % IN WATER 50 %
15 SYRINGE (ML) INTRAVENOUS ONCE
Refills: 0 | Status: DISCONTINUED | OUTPATIENT
Start: 2023-03-01 | End: 2023-03-03

## 2023-03-01 RX ORDER — ASPIRIN/CALCIUM CARB/MAGNESIUM 324 MG
81 TABLET ORAL DAILY
Refills: 0 | Status: DISCONTINUED | OUTPATIENT
Start: 2023-03-01 | End: 2023-03-03

## 2023-03-01 RX ORDER — DEXTROSE 50 % IN WATER 50 %
12.5 SYRINGE (ML) INTRAVENOUS ONCE
Refills: 0 | Status: DISCONTINUED | OUTPATIENT
Start: 2023-03-01 | End: 2023-03-03

## 2023-03-01 RX ORDER — CALAMINE AND ZINC OXIDE AND PHENOL 160; 10 MG/ML; MG/ML
1 LOTION TOPICAL
Refills: 0 | Status: DISCONTINUED | OUTPATIENT
Start: 2023-03-01 | End: 2023-03-03

## 2023-03-01 RX ORDER — METOPROLOL TARTRATE 50 MG
25 TABLET ORAL DAILY
Refills: 0 | Status: DISCONTINUED | OUTPATIENT
Start: 2023-03-01 | End: 2023-03-03

## 2023-03-01 RX ORDER — DIPHENHYDRAMINE HCL 50 MG
25 CAPSULE ORAL ONCE
Refills: 0 | Status: COMPLETED | OUTPATIENT
Start: 2023-03-01 | End: 2023-03-01

## 2023-03-01 RX ADMIN — Medication 3 MILLIGRAM(S): at 06:52

## 2023-03-01 RX ADMIN — Medication 5 MILLIGRAM(S): at 05:21

## 2023-03-01 RX ADMIN — Medication 1 APPLICATION(S): at 17:18

## 2023-03-01 RX ADMIN — Medication 25 MILLIGRAM(S): at 05:20

## 2023-03-01 RX ADMIN — BUDESONIDE AND FORMOTEROL FUMARATE DIHYDRATE 2 PUFF(S): 160; 4.5 AEROSOL RESPIRATORY (INHALATION) at 14:20

## 2023-03-01 RX ADMIN — CALAMINE AND ZINC OXIDE AND PHENOL 1 APPLICATION(S): 160; 10 LOTION TOPICAL at 04:41

## 2023-03-01 RX ADMIN — Medication 3 MILLIGRAM(S): at 22:33

## 2023-03-01 RX ADMIN — Medication 0: at 10:00

## 2023-03-01 RX ADMIN — PERMETHRIN CREAM 5% W/W 1 APPLICATION(S): 50 CREAM TOPICAL at 22:18

## 2023-03-01 RX ADMIN — BUDESONIDE AND FORMOTEROL FUMARATE DIHYDRATE 2 PUFF(S): 160; 4.5 AEROSOL RESPIRATORY (INHALATION) at 22:17

## 2023-03-01 RX ADMIN — MONTELUKAST 10 MILLIGRAM(S): 4 TABLET, CHEWABLE ORAL at 14:20

## 2023-03-01 RX ADMIN — LEVETIRACETAM 500 MILLIGRAM(S): 250 TABLET, FILM COATED ORAL at 17:17

## 2023-03-01 RX ADMIN — Medication 1 APPLICATION(S): at 22:18

## 2023-03-01 RX ADMIN — Medication 81 MILLIGRAM(S): at 14:20

## 2023-03-01 RX ADMIN — LEVETIRACETAM 500 MILLIGRAM(S): 250 TABLET, FILM COATED ORAL at 05:21

## 2023-03-01 RX ADMIN — CALAMINE AND ZINC OXIDE AND PHENOL 1 APPLICATION(S): 160; 10 LOTION TOPICAL at 17:18

## 2023-03-01 RX ADMIN — Medication 25 MILLIGRAM(S): at 14:19

## 2023-03-01 NOTE — PROGRESS NOTE ADULT - PROBLEM SELECTOR PLAN 7
- Patient unsure of her medications, reconciled as per SureScripts and ED note, med history pharmacist emailed for help in her medication reconciliation - pt on Montelukast, Symbicort and Prednisone   - will continue

## 2023-03-01 NOTE — PHYSICAL THERAPY INITIAL EVALUATION ADULT - LEVEL OF INDEPENDENCE: SIT/STAND, REHAB EVAL
To be assessed; pt deferred after + encouragement 2/2 feeling tired and not eating all day; pt agreeable to try next session.

## 2023-03-01 NOTE — PROGRESS NOTE ADULT - PROBLEM SELECTOR PLAN 3
- New onset of an intensely pruritic maculopapular rash, noted to have significant excoriations on her back and arms  - Unclear on time frame of rash, unclear on causation of rash (possibly 2/2 tramadol as per son but unknown timing of rash)  - Clarify timing of rash and any new medications with patient's son in AM  - Patient's eosinophilia likely 2/2 rash, would trend for now  - Will place patient on calamine lotion for now - New onset of an intensely pruritic maculopapular rash, noted to have significant excoriations on her back and arms  - Unclear on time frame of rash, unclear on causation of rash (possibly 2/2 tramadol as per son but unknown timing of rash)  - Patient's eosinophilia likely 2/2 rash, would trend for now  -Dermatology eval appreciated, recs noted

## 2023-03-01 NOTE — CONSULT NOTE ADULT - SUBJECTIVE AND OBJECTIVE BOX
HPI:  87F admitted for seizures. Dermatology consulted for a rash on the trunk of unknown duration. It is itchy, and no traetments have been tried. No mucous membrane involvement. The primary team is unsure of any new medication triggers.     PAST MEDICAL & SURGICAL HISTORY:  DM (diabetes mellitus)      History of seizure disorder      Rheumatoid disease      Essential hypertension      Hyperlipidemia      Asthma      S/P cataract surgery          Review of Systems: ____________________________________  REVIEW OF SYSTEMS      General: no fevers/chills, no lethary	    Skin/Breast: see HPI  	  Ophthalmologic: no eye pain or change in vision  	  ENMT: no dysphagia or change in hearing    Respiratory and Thorax: no SOB or cough  	  Cardiovascular: no palpitations or chest pain    Gastrointestinal: no abdomenal pain or blood in stool     Genitourinary: no dysuria or frequency    Musculoskeletal: no joint pains or weakness	    Neurological:no + recent seizures    MEDICATIONS  (STANDING):  aspirin enteric coated 81 milliGRAM(s) Oral daily  budesonide  80 MICROgram(s)/formoterol 4.5 MICROgram(s) Inhaler 2 Puff(s) Inhalation two times a day  calamine/zinc oxide Lotion 1 Application(s) Topical two times a day  dextrose 5%. 1000 milliLiter(s) IV Continuous <Continuous>  dextrose 5%. 1000 milliLiter(s) IV Continuous <Continuous>  dextrose 50% Injectable 25 Gram(s) IV Push once  dextrose 50% Injectable 12.5 Gram(s) IV Push once  dextrose 50% Injectable 25 Gram(s) IV Push once  diphenhydrAMINE 25 milliGRAM(s) Oral once  glucagon  Injectable 1 milliGRAM(s) IntraMuscular once  influenza  Vaccine (HIGH DOSE) 0.7 milliLiter(s) IntraMuscular once  insulin lispro (ADMELOG) corrective regimen sliding scale   SubCutaneous three times a day before meals  insulin lispro (ADMELOG) corrective regimen sliding scale   SubCutaneous at bedtime  levETIRAcetam 500 milliGRAM(s) Oral two times a day  metoprolol succinate ER 25 milliGRAM(s) Oral daily  montelukast 10 milliGRAM(s) Oral daily  predniSONE   Tablet 5 milliGRAM(s) Oral daily    ALLERGIES: tramadol        SOCIAL HISTORY:    Social History:  Denies tobacco, EtOH, or illicit substance use  Lives with son  Ambulates with walker  FAMILY HISTORY:   FAMILY HISTORY:  No pertinent family history in first degree relatives          VITAL SIGNS LAST 24 HOURS:  T(F): 98 ( @ 05:28), Max: 98.6 ( @ 17:23)  HR: 86 ( @ 08:45) (75 - 91)  BP: 166/86 ( @ 08:45) (128/87 - 190/78)  RR: 14 ( @ 08:45) (14 - 20)    ___________________________________  PHYSICAL EXAM:     The patient was alert and oriented X 3, well nourished, and in no  apparent distress.  OP showed no ulcerations  There was no visible lymphadenopathy.  Conjunctiva were non injected  There was no clubbing or edema of extremities.  The scalp, hair, face, eyebrows, lips, OP, neck, chest, back,   extremities X 4, nails were examined.  There was no hyperhidrosis or bromhidrosis.    Of note on skin exam:     ____________________________________    LABS:                        11.4   5.60  )-----------( 174      ( 01 Mar 2023 05:15 )             38.2     -    143  |  106  |  16  ----------------------------<  93  3.8   |  24  |  0.86    Ca    9.7      01 Mar 2023 05:15  Phos  2.9     -  Mg     1.70     -    TPro  8.1  /  Alb  4.1  /  TBili  0.6  /  DBili  x   /  AST  32  /  ALT  16  /  AlkPhos  70        Urinalysis Basic - ( 2023 19:18 )    Color: Light Yellow / Appearance: Clear / S.016 / pH: x  Gluc: x / Ketone: Negative  / Bili: Negative / Urobili: <2 mg/dL   Blood: x / Protein: Trace / Nitrite: Negative   Leuk Esterase: Large / RBC: 4 /HPF / WBC 15 /HPF   Sq Epi: x / Non Sq Epi: 11 /HPF / Bacteria: Negative     HPI:  87F admitted for seizures. Dermatology consulted for a rash on the trunk of unknown duration. It is itchy, and no traetments have been tried. No mucous membrane involvement. The primary team is unsure of any new medication triggers.     I spoke with the son (Wesley). He only reports tramadol being the only new medication started about 1 month ago and stopped about 2 weeks ago. Her pain management doctor took her off the medication. She goes to pain management for severe arthritis. I then spoke with her other son (Milton) who said that no other contacts around her are itchy. He was not aware of any new medications. The patient lives with Milton, who leaves frequently. He recalls this rash started about 5 months ago. States the patient is always itchy.    PAST MEDICAL & SURGICAL HISTORY:  DM (diabetes mellitus)      History of seizure disorder      Rheumatoid disease      Essential hypertension      Hyperlipidemia      Asthma      S/P cataract surgery          Review of Systems:   REVIEW OF SYSTEMS      General: no fevers/chills, no lethary	    Skin/Breast: see HPI  	  Ophthalmologic: no eye pain or change in vision  	  ENMT: no dysphagia or change in hearing    Respiratory and Thorax: no SOB or cough  	  Cardiovascular: no palpitations or chest pain    Gastrointestinal: no abdomenal pain or blood in stool     Genitourinary: + for dysuria or frequency    Musculoskeletal: no joint pains or weakness	    Neurological:no + recent seizures    MEDICATIONS  (STANDING):  aspirin enteric coated 81 milliGRAM(s) Oral daily  budesonide  80 MICROgram(s)/formoterol 4.5 MICROgram(s) Inhaler 2 Puff(s) Inhalation two times a day  calamine/zinc oxide Lotion 1 Application(s) Topical two times a day  dextrose 5%. 1000 milliLiter(s) IV Continuous <Continuous>  dextrose 5%. 1000 milliLiter(s) IV Continuous <Continuous>  dextrose 50% Injectable 25 Gram(s) IV Push once  dextrose 50% Injectable 12.5 Gram(s) IV Push once  dextrose 50% Injectable 25 Gram(s) IV Push once  diphenhydrAMINE 25 milliGRAM(s) Oral once  glucagon  Injectable 1 milliGRAM(s) IntraMuscular once  influenza  Vaccine (HIGH DOSE) 0.7 milliLiter(s) IntraMuscular once  insulin lispro (ADMELOG) corrective regimen sliding scale   SubCutaneous three times a day before meals  insulin lispro (ADMELOG) corrective regimen sliding scale   SubCutaneous at bedtime  levETIRAcetam 500 milliGRAM(s) Oral two times a day  metoprolol succinate ER 25 milliGRAM(s) Oral daily  montelukast 10 milliGRAM(s) Oral daily  predniSONE   Tablet 5 milliGRAM(s) Oral daily    ALLERGIES: tramadol        SOCIAL HISTORY:    Social History:  Denies tobacco, EtOH, or illicit substance use  Lives with son  Ambulates with walker  FAMILY HISTORY:   FAMILY HISTORY:  No pertinent family history in first degree relatives          VITAL SIGNS LAST 24 HOURS:  T(F): 98 ( @ 05:28), Max: 98.6 ( @ 17:23)  HR: 86 ( @ 08:45) (75 - 91)  BP: 166/86 ( @ 08:45) (128/87 - 190/78)  RR: 14 ( @ 08:45) (14 - 20)      PHYSICAL EXAM:     OP showed no ulcerations  There was no visible lymphadenopathy.  Conjunctiva were non injected  There was no clubbing or edema of extremities.  The scalp, hair, face, eyebrows, lips, OP, neck, chest, back,   extremities X 4, nails were examined.  There was no hyperhidrosis or bromhidrosis.    Of note on skin exam:     physical exam was limited by the use of calamine lotion on the skin  slightly scaly pink papules and plaques on the trunk  pink plaques in the groin  linear papules and excoriations  hands and feet clear  mouth and eyes clear    LABS:                        11.4   5.60  )-----------( 174      ( 01 Mar 2023 05:15 )             38.2     03-    143  |  106  |  16  ----------------------------<  93  3.8   |  24  |  0.86    Ca    9.7      01 Mar 2023 05:15  Phos  2.9     03-  Mg     1.70     -    TPro  8.1  /  Alb  4.1  /  TBili  0.6  /  DBili  x   /  AST  32  /  ALT  16  /  AlkPhos  70        Urinalysis Basic - ( 2023 19:18 )    Color: Light Yellow / Appearance: Clear / S.016 / pH: x  Gluc: x / Ketone: Negative  / Bili: Negative / Urobili: <2 mg/dL   Blood: x / Protein: Trace / Nitrite: Negative   Leuk Esterase: Large / RBC: 4 /HPF / WBC 15 /HPF   Sq Epi: x / Non Sq Epi: 11 /HPF / Bacteria: Negative

## 2023-03-01 NOTE — PROGRESS NOTE ADULT - PROBLEM SELECTOR PLAN 1
- Patient with a few episodes of LOC but unclear on cause, did report having palpitations prior to her episodes so higher suspicion for cardiac cause but as per ED note patient's son reported her having seizures  - Would need to clarify LOC episodes with son, left VM for call back  - Neurology evaluation appreciated with check a routine EEG  - Check orthostatics  - Monitor on telemetry  - Monitor for further episodes of LOC  - Fall, aspiration, and seizure precautions - Patient with a few episodes of LOC but unclear on cause, did report having palpitations prior to her episodes so higher suspicion for cardiac cause but as per ED note patient's son reported her having seizures  - Neurology evaluation appreciated with check a routine EEG  - Check orthostatics  - Monitor on telemetry  - Monitor for further episodes of LOC  - Fall, aspiration, and seizure precautions

## 2023-03-01 NOTE — PHYSICAL THERAPY INITIAL EVALUATION ADULT - PERTINENT HX OF CURRENT PROBLEM, REHAB EVAL
This is an 87F with history of DM2, HTN, HLD, Rheumatoid Arthritis on Prednisone and Leflunomide, Asthma, and Seizure d/o who presents to the hospital with complaints of syncope. Patient said that she has had 2 episodes over the past few days. Most recent one was earlier today, said that she was on the toilet when she started to feel dizzy, asked her son to take her to the couch and then passed out on the couch. She does not remember how long she was out for but reports having some NBNB emesis after waking up. Denies any falls or head strike. Patient reported some palpitations with the dizziness but otherwise no chest pain. Also reports a pruritic rash over her arms, chest, and back but cannot state how long the rash has been present for. States that she was told to take a medication for a few days for the rash but does not know the name of the medication. Also states that she had some dysuria and urinary frequency recently but currently denies any urinary complaints, no hematuria. No fevers/diaphoresis but does report chills. No other acute complaints. CT of Head (-)No acute intracranial hemorrhage, mass effect, or evidence of acute vascular territorial infarction

## 2023-03-01 NOTE — CONSULT NOTE ADULT - ASSESSMENT
==========INCOMPLETE==========  note/recommendations are not complete until attending signature/attestation    The patient's chart was reviewed in addition to being seen and examined at bedside with the dermatology attending.  Recommendations were communicated with the primary team.  Please page [phone] with a 10-digit call-back number for further related questions.    Oleg Ballesteros MD  Resident Physician, PGY-3  [facility] Dermatology  Pager: [phone]  Office: [phone]   ==========INCOMPLETE==========  note/recommendations are not complete until attending signature/attestation    The patient's chart was reviewed in addition to being seen and examined at bedside with the dermatology attending.  Recommendations were communicated with the primary team.  Please page [phone] with a 10-digit call-back number for further related questions.    Oleg Ballesteros MD  Resident Physician, PGY-3  [facility] Dermatology  Pager: [phone]  Office: [phone]   ==========INCOMPLETE==========  note/recommendations are not complete until attending signature/attestation    The patient's chart was reviewed in addition to being seen and examined at bedside with the dermatology attending.  Recommendations were communicated with the primary team.  Please page [phone] with a 10-digit call-back number for further related questions.    Oleg Ballesteros MD  Resident Physician, PGY-3  [facility] Dermatology  Pager: [phone]  Office: [phone]   #Dermatitis, resolving  - DDx includes an atopic eruption, resolving morbilliform drug--as patient does demonstrate some eosinophilia >> BP, infestation  - Morphology difficult to discern due to the resolving nature and overlying calamine lotion  - History obtained from 2 sons, Wesley and Milton. Tramadol is the only confirmed new medication in the past few months  - Start clobetasol 0.05% ointment BID to affected areas for up to 2 weeks on, then 1 week off.  - Start cetirizine 10 mg PO BID prn itch  - Could represent urticarial phase of BP. Would recommend drawing /230 antibodies  - Low suspicion for scabies/infestations, as there are no burrows or hand lesions. But due to the involvement of the nipples and groin, would recommend treating with permethrin 5% cream from head to toe, rinse off after 8-14 hours. Repeat in 1 week.    The patient's chart was reviewed in addition to being seen and examined at bedside with the dermatology attending.  Recommendations were communicated with the primary team.  Please page [phone] with a 10-digit call-back number for further related questions.    Oleg Ballesteros MD  Resident Physician, PGY-3  [facility] Dermatology  Pager: [phone]  Office: [phone]   #Dermatitis, resolving  - DDx includes an atopic eruption, resolving morbilliform drug--as patient does demonstrate some eosinophilia >> BP, infestation  - Morphology difficult to discern due to the resolving nature and overlying calamine lotion  - History obtained from 2 sons, Wesley and Milton. Tramadol is the only confirmed new medication in the past few months  - Start clobetasol 0.05% ointment BID to affected areas for up to 2 weeks on, then 1 week off.  - Start cetirizine 10 mg PO BID prn itch  - Could represent urticarial phase of BP. Would recommend drawing /230 antibodies  - Low suspicion for scabies/infestations, as there are no burrows or hand lesions. But due to the involvement of the nipples and groin, would recommend treating with permethrin 5% cream from head to toe, rinse off after 8-14 hours. Repeat in 1 week.    The patient's chart was reviewed in addition to being seen and examined at bedside with the dermatology attending.  Recommendations were communicated with the primary team.  Please page [phone] with a 10-digit call-back number for further related questions.    Oleg Ballesteros MD  Resident Physician, PGY-3  [facility] Dermatology  Pager: [phone]  Office: [phone]   #Dermatitis, resolving  - DDx includes an atopic eruption, resolving morbilliform drug--as patient does demonstrate some eosinophilia >> BP, infestation  - Morphology difficult to discern due to the resolving nature and overlying calamine lotion  - History obtained from 2 sons, Wesley and Milton. Tramadol is the only confirmed new medication in the past few months  - Start clobetasol 0.05% ointment BID to affected areas for up to 2 weeks on, then 1 week off.  - Start cetirizine 10 mg PO BID prn itch  - Could represent urticarial phase of BP. Would recommend drawing /230 antibodies  - Low suspicion for scabies/infestations, as there are no burrows or hand lesions. But due to the involvement of the nipples and groin, would recommend treating with permethrin 5% cream from head to toe, rinse off after 8-14 hours. Repeat in 1 week.    The patient's chart was reviewed in addition to being seen and examined at bedside with the dermatology attending.  Recommendations were communicated with the primary team.  Please page [phone] with a 10-digit call-back number for further related questions.    Oleg Ballesteros MD  Resident Physician, PGY-3  [facility] Dermatology  Pager: [phone]  Office: [phone]

## 2023-03-01 NOTE — PROGRESS NOTE ADULT - PROBLEM SELECTOR PLAN 4
- Reports having had dysuria and urinary frequency recently but unable to state when, denies any urinary complaints at present  - s/p ceftriaxone in ED, as per ED note was on keflex as outpatient  - Here UA with pyuria but also with some epithelial cells, unclear if patient still with UTI or if pyuria residual from treated UTI  - Given her eosinophilia and new pruritic rash with recent abx use concern that she might be having a drug reaction -> need to clarify timing of rash and abx use with son in AM, for now will hold off on further abx, f/u UCx - Reports having had dysuria and urinary frequency recently but unable to state when, denies any urinary complaints at present  - s/p ceftriaxone in ED, as per ED note was on keflex as outpatient  - Here UA with pyuria but also with some epithelial cells, unclear if patient still with UTI or if pyuria residual from treated UTI  - Given her eosinophilia and new pruritic rash with recent abx use concern that she might be having a drug reaction ->  for now will hold off on further abx, f/u UCx

## 2023-03-01 NOTE — PHYSICAL THERAPY INITIAL EVALUATION ADULT - GENERAL OBSERVATIONS, REHAB EVAL
Pt encountered in semisupine position, no distress, AxOx2/3, with +IV, and daughter @ bedside. Pt agreeable to participate in PT evaluation.

## 2023-03-01 NOTE — PROGRESS NOTE ADULT - PROBLEM SELECTOR PLAN 6
- Patient unsure of her medications, reconciled as per SureScripts and ED note, med history pharmacist emailed for help in her medication reconciliation - Pt on Losartan 100 mg and Toprol XL 25 mg   - not sure if patient compliant with medications,  toprol  resumed , /72

## 2023-03-01 NOTE — PROGRESS NOTE ADULT - SUBJECTIVE AND OBJECTIVE BOX
Patient is a 87y old  Female who presents with a chief complaint of Syncope (2023 23:44)      SUBJECTIVE / OVERNIGHT EVENTS:    MEDICATIONS  (STANDING):  aspirin enteric coated 81 milliGRAM(s) Oral daily  budesonide  80 MICROgram(s)/formoterol 4.5 MICROgram(s) Inhaler 2 Puff(s) Inhalation two times a day  calamine/zinc oxide Lotion 1 Application(s) Topical two times a day  dextrose 5%. 1000 milliLiter(s) (50 mL/Hr) IV Continuous <Continuous>  dextrose 5%. 1000 milliLiter(s) (100 mL/Hr) IV Continuous <Continuous>  dextrose 50% Injectable 25 Gram(s) IV Push once  dextrose 50% Injectable 12.5 Gram(s) IV Push once  dextrose 50% Injectable 25 Gram(s) IV Push once  glucagon  Injectable 1 milliGRAM(s) IntraMuscular once  influenza  Vaccine (HIGH DOSE) 0.7 milliLiter(s) IntraMuscular once  insulin lispro (ADMELOG) corrective regimen sliding scale   SubCutaneous three times a day before meals  insulin lispro (ADMELOG) corrective regimen sliding scale   SubCutaneous at bedtime  levETIRAcetam 500 milliGRAM(s) Oral two times a day  metoprolol succinate ER 25 milliGRAM(s) Oral daily  montelukast 10 milliGRAM(s) Oral daily  predniSONE   Tablet 5 milliGRAM(s) Oral daily    MEDICATIONS  (PRN):  acetaminophen     Tablet .. 650 milliGRAM(s) Oral every 6 hours PRN Temp greater or equal to 38C (100.4F), Mild Pain (1 - 3)  aluminum hydroxide/magnesium hydroxide/simethicone Suspension 30 milliLiter(s) Oral every 4 hours PRN Dyspepsia  dextrose Oral Gel 15 Gram(s) Oral once PRN Blood Glucose LESS THAN 70 milliGRAM(s)/deciliter  melatonin 3 milliGRAM(s) Oral at bedtime PRN Insomnia  ondansetron Injectable 4 milliGRAM(s) IV Push every 8 hours PRN Nausea and/or Vomiting      Vital Signs Last 24 Hrs  T(C): 36.7 (01 Mar 2023 05:28), Max: 37 (2023 17:23)  T(F): 98 (01 Mar 2023 05:28), Max: 98.6 (2023 17:23)  HR: 86 (01 Mar 2023 08:45) (75 - 91)  BP: 166/86 (01 Mar 2023 08:45) (128/87 - 190/78)  BP(mean): --  RR: 14 (01 Mar 2023 08:45) (14 - 20)  SpO2: 99% (01 Mar 2023 08:45) (96% - 100%)    Parameters below as of 01 Mar 2023 08:45  Patient On (Oxygen Delivery Method): room air      CAPILLARY BLOOD GLUCOSE      POCT Blood Glucose.: 106 mg/dL (01 Mar 2023 09:58)  POCT Blood Glucose.: 94 mg/dL (01 Mar 2023 05:20)  POCT Blood Glucose.: 161 mg/dL (2023 17:34)    I&O's Summary      PHYSICAL EXAM:  GENERAL: NAD, well-developed  HEAD:  Atraumatic, Normocephalic  EYES: EOMI, PERRLA, conjunctiva and sclera clear  NECK: Supple, No JVD  CHEST/LUNG: Clear to auscultation bilaterally; No wheeze  HEART: Regular rate and rhythm; No murmurs, rubs, or gallops  ABDOMEN: Soft, Nontender, Nondistended; Bowel sounds present  EXTREMITIES:  2+ Peripheral Pulses, No clubbing, cyanosis, or edema  PSYCH: AAOx3  NEUROLOGY: non-focal  SKIN: No rashes or lesions    LABS:                        11.4   5.60  )-----------( 174      ( 01 Mar 2023 05:15 )             38.2     03-01    143  |  106  |  16  ----------------------------<  93  3.8   |  24  |  0.86    Ca    9.7      01 Mar 2023 05:15  Phos  2.9     03-01  Mg     1.70     03-01    TPro  8.1  /  Alb  4.1  /  TBili  0.6  /  DBili  x   /  AST  32  /  ALT  16  /  AlkPhos  70  03-01      CARDIAC MARKERS ( 2023 19:18 )  x     / x     / 142 U/L / x     / x          Urinalysis Basic - ( 2023 19:18 )    Color: Light Yellow / Appearance: Clear / S.016 / pH: x  Gluc: x / Ketone: Negative  / Bili: Negative / Urobili: <2 mg/dL   Blood: x / Protein: Trace / Nitrite: Negative   Leuk Esterase: Large / RBC: 4 /HPF / WBC 15 /HPF   Sq Epi: x / Non Sq Epi: 11 /HPF / Bacteria: Negative        RADIOLOGY & ADDITIONAL TESTS:    Imaging Personally Reviewed:    Consultant(s) Notes Reviewed:      Care Discussed with Consultants/Other Providers:   Patient is a 87y old  Female who presents with a chief complaint of Syncope (2023 23:44)      SUBJECTIVE / OVERNIGHT EVENTS: patient seen and examined by bedside ,Pt c/o itching generalized. Informed by staff that pt pulled out EEG and Telemetry  leads        MEDICATIONS  (STANDING):  aspirin enteric coated 81 milliGRAM(s) Oral daily  budesonide  80 MICROgram(s)/formoterol 4.5 MICROgram(s) Inhaler 2 Puff(s) Inhalation two times a day  calamine/zinc oxide Lotion 1 Application(s) Topical two times a day  dextrose 5%. 1000 milliLiter(s) (50 mL/Hr) IV Continuous <Continuous>  dextrose 5%. 1000 milliLiter(s) (100 mL/Hr) IV Continuous <Continuous>  dextrose 50% Injectable 25 Gram(s) IV Push once  dextrose 50% Injectable 12.5 Gram(s) IV Push once  dextrose 50% Injectable 25 Gram(s) IV Push once  glucagon  Injectable 1 milliGRAM(s) IntraMuscular once  influenza  Vaccine (HIGH DOSE) 0.7 milliLiter(s) IntraMuscular once  insulin lispro (ADMELOG) corrective regimen sliding scale   SubCutaneous three times a day before meals  insulin lispro (ADMELOG) corrective regimen sliding scale   SubCutaneous at bedtime  levETIRAcetam 500 milliGRAM(s) Oral two times a day  metoprolol succinate ER 25 milliGRAM(s) Oral daily  montelukast 10 milliGRAM(s) Oral daily  predniSONE   Tablet 5 milliGRAM(s) Oral daily    MEDICATIONS  (PRN):  acetaminophen     Tablet .. 650 milliGRAM(s) Oral every 6 hours PRN Temp greater or equal to 38C (100.4F), Mild Pain (1 - 3)  aluminum hydroxide/magnesium hydroxide/simethicone Suspension 30 milliLiter(s) Oral every 4 hours PRN Dyspepsia  dextrose Oral Gel 15 Gram(s) Oral once PRN Blood Glucose LESS THAN 70 milliGRAM(s)/deciliter  melatonin 3 milliGRAM(s) Oral at bedtime PRN Insomnia  ondansetron Injectable 4 milliGRAM(s) IV Push every 8 hours PRN Nausea and/or Vomiting      Vital Signs Last 24 Hrs  T(C): 36.7 (01 Mar 2023 05:28), Max: 37 (2023 17:23)  T(F): 98 (01 Mar 2023 05:28), Max: 98.6 (2023 17:23)  HR: 86 (01 Mar 2023 08:45) (75 - 91)  BP: 166/86 (01 Mar 2023 08:45) (128/87 - 190/78)  BP(mean): --  RR: 14 (01 Mar 2023 08:45) (14 - 20)  SpO2: 99% (01 Mar 2023 08:45) (96% - 100%)    Parameters below as of 01 Mar 2023 08:45  Patient On (Oxygen Delivery Method): room air      CAPILLARY BLOOD GLUCOSE      POCT Blood Glucose.: 106 mg/dL (01 Mar 2023 09:58)  POCT Blood Glucose.: 94 mg/dL (01 Mar 2023 05:20)  POCT Blood Glucose.: 161 mg/dL (2023 17:34)    I&O's Summary      PHYSICAL EXAM:  GENERAL: NAD, well-developed, pt scratching herself during the interview   HEAD:  Atraumatic, Normocephalic  EYES: EOMI, PERRLA, conjunctiva and sclera clear  NECK: Supple, No JVD  CHEST/LUNG: Clear to auscultation bilaterally; No wheeze  HEART: Regular rate and rhythm; No murmurs, rubs, or gallops  ABDOMEN: Soft, Nontender, Nondistended; Bowel sounds present  EXTREMITIES:  2+ Peripheral Pulses, No clubbing, cyanosis, or edema  PSYCH: AAOx3, aware of being in hospital , does not know the name, but able to state month, date and day correctly   NEUROLOGY: non-focal  SKIN: +Maculopapular rash on b/l arms and back with multiple areas of excoriations, No lesions seen  LABS:                        11.4   5.60  )-----------( 174      ( 01 Mar 2023 05:15 )             38.2     03-01    143  |  106  |  16  ----------------------------<  93  3.8   |  24  |  0.86    Ca    9.7      01 Mar 2023 05:15  Phos  2.9     03-01  Mg     1.70     03-01    TPro  8.1  /  Alb  4.1  /  TBili  0.6  /  DBili  x   /  AST  32  /  ALT  16  /  AlkPhos  70  03-01      CARDIAC MARKERS ( 2023 19:18 )  x     / x     / 142 U/L / x     / x          Urinalysis Basic - ( 2023 19:18 )    Color: Light Yellow / Appearance: Clear / S.016 / pH: x  Gluc: x / Ketone: Negative  / Bili: Negative / Urobili: <2 mg/dL   Blood: x / Protein: Trace / Nitrite: Negative   Leuk Esterase: Large / RBC: 4 /HPF / WBC 15 /HPF   Sq Epi: x / Non Sq Epi: 11 /HPF / Bacteria: Negative        RADIOLOGY & ADDITIONAL TESTS:    Imaging Personally Reviewed:    Consultant(s) Notes Reviewed:  Neurology,      Care Discussed with Consultants/Other Providers: Dermatology    Patient is a 87y old  Female who presents with a chief complaint of Syncope (2023 23:44)      SUBJECTIVE / OVERNIGHT EVENTS: patient seen and examined by bedside ,Pt c/o itching generalized. Informed by staff that pt pulled out EEG and Telemetry  leads , pt says had headache before, now resolved, denies, dizziness, SOB, CP, Palpitations , N/V/D, abdominal pain  History obtained with Malay translation     MEDICATIONS  (STANDING):  aspirin enteric coated 81 milliGRAM(s) Oral daily  budesonide  80 MICROgram(s)/formoterol 4.5 MICROgram(s) Inhaler 2 Puff(s) Inhalation two times a day  calamine/zinc oxide Lotion 1 Application(s) Topical two times a day  dextrose 5%. 1000 milliLiter(s) (50 mL/Hr) IV Continuous <Continuous>  dextrose 5%. 1000 milliLiter(s) (100 mL/Hr) IV Continuous <Continuous>  dextrose 50% Injectable 25 Gram(s) IV Push once  dextrose 50% Injectable 12.5 Gram(s) IV Push once  dextrose 50% Injectable 25 Gram(s) IV Push once  glucagon  Injectable 1 milliGRAM(s) IntraMuscular once  influenza  Vaccine (HIGH DOSE) 0.7 milliLiter(s) IntraMuscular once  insulin lispro (ADMELOG) corrective regimen sliding scale   SubCutaneous three times a day before meals  insulin lispro (ADMELOG) corrective regimen sliding scale   SubCutaneous at bedtime  levETIRAcetam 500 milliGRAM(s) Oral two times a day  metoprolol succinate ER 25 milliGRAM(s) Oral daily  montelukast 10 milliGRAM(s) Oral daily  predniSONE   Tablet 5 milliGRAM(s) Oral daily    MEDICATIONS  (PRN):  acetaminophen     Tablet .. 650 milliGRAM(s) Oral every 6 hours PRN Temp greater or equal to 38C (100.4F), Mild Pain (1 - 3)  aluminum hydroxide/magnesium hydroxide/simethicone Suspension 30 milliLiter(s) Oral every 4 hours PRN Dyspepsia  dextrose Oral Gel 15 Gram(s) Oral once PRN Blood Glucose LESS THAN 70 milliGRAM(s)/deciliter  melatonin 3 milliGRAM(s) Oral at bedtime PRN Insomnia  ondansetron Injectable 4 milliGRAM(s) IV Push every 8 hours PRN Nausea and/or Vomiting      Vital Signs Last 24 Hrs  T(C): 36.7 (01 Mar 2023 05:28), Max: 37 (2023 17:23)  T(F): 98 (01 Mar 2023 05:28), Max: 98.6 (2023 17:23)  HR: 86 (01 Mar 2023 08:45) (75 - 91)  BP: 166/86 (01 Mar 2023 08:45) (128/87 - 190/78)  BP(mean): --  RR: 14 (01 Mar 2023 08:45) (14 - 20)  SpO2: 99% (01 Mar 2023 08:45) (96% - 100%)    Parameters below as of 01 Mar 2023 08:45  Patient On (Oxygen Delivery Method): room air      CAPILLARY BLOOD GLUCOSE      POCT Blood Glucose.: 106 mg/dL (01 Mar 2023 09:58)  POCT Blood Glucose.: 94 mg/dL (01 Mar 2023 05:20)  POCT Blood Glucose.: 161 mg/dL (2023 17:34)    I&O's Summary      PHYSICAL EXAM:  GENERAL: NAD, well-developed, pt scratching herself during the interview   HEAD:  Atraumatic, Normocephalic  EYES: EOMI, PERRLA, conjunctiva and sclera clear  NECK: Supple, No JVD  CHEST/LUNG: Clear to auscultation bilaterally; No wheeze  HEART: Regular rate and rhythm; No murmurs, rubs, or gallops  ABDOMEN: Soft, Nontender, Nondistended; Bowel sounds present  EXTREMITIES:  2+ Peripheral Pulses, No clubbing, cyanosis, or edema  PSYCH: AAOx3, aware of being in hospital , does not know the name, but able to state month, date and day correctly   NEUROLOGY: non-focal  SKIN: +Maculopapular rash on b/l arms and back with multiple areas of excoriations,   LABS:                        11.4   5.60  )-----------( 174      ( 01 Mar 2023 05:15 )             38.2     03-01    143  |  106  |  16  ----------------------------<  93  3.8   |  24  |  0.86    Ca    9.7      01 Mar 2023 05:15  Phos  2.9     03-01  Mg     1.70         TPro  8.1  /  Alb  4.1  /  TBili  0.6  /  DBili  x   /  AST  32  /  ALT  16  /  AlkPhos  70        CARDIAC MARKERS ( 2023 19:18 )  x     / x     / 142 U/L / x     / x          Urinalysis Basic - ( 2023 19:18 )    Color: Light Yellow / Appearance: Clear / S.016 / pH: x  Gluc: x / Ketone: Negative  / Bili: Negative / Urobili: <2 mg/dL   Blood: x / Protein: Trace / Nitrite: Negative   Leuk Esterase: Large / RBC: 4 /HPF / WBC 15 /HPF   Sq Epi: x / Non Sq Epi: 11 /HPF / Bacteria: Negative        RADIOLOGY & ADDITIONAL TESTS:    Imaging Personally Reviewed:    Consultant(s) Notes Reviewed:  Neurology,  Dermatology     Care Discussed with Consultants/Other Providers: Dermatology    Patient is a 87y old  Female who presents with a chief complaint of Syncope (2023 23:44)      SUBJECTIVE / OVERNIGHT EVENTS: patient seen and examined by bedside ,Pt c/o itching generalized. Informed by staff that pt pulled out EEG and Telemetry  leads , pt says had headache before, now resolved, denies, dizziness, SOB, CP, Palpitations , N/V/D, abdominal pain  History obtained with Yoruba translation     MEDICATIONS  (STANDING):  aspirin enteric coated 81 milliGRAM(s) Oral daily  budesonide  80 MICROgram(s)/formoterol 4.5 MICROgram(s) Inhaler 2 Puff(s) Inhalation two times a day  calamine/zinc oxide Lotion 1 Application(s) Topical two times a day  dextrose 5%. 1000 milliLiter(s) (50 mL/Hr) IV Continuous <Continuous>  dextrose 5%. 1000 milliLiter(s) (100 mL/Hr) IV Continuous <Continuous>  dextrose 50% Injectable 25 Gram(s) IV Push once  dextrose 50% Injectable 12.5 Gram(s) IV Push once  dextrose 50% Injectable 25 Gram(s) IV Push once  glucagon  Injectable 1 milliGRAM(s) IntraMuscular once  influenza  Vaccine (HIGH DOSE) 0.7 milliLiter(s) IntraMuscular once  insulin lispro (ADMELOG) corrective regimen sliding scale   SubCutaneous three times a day before meals  insulin lispro (ADMELOG) corrective regimen sliding scale   SubCutaneous at bedtime  levETIRAcetam 500 milliGRAM(s) Oral two times a day  metoprolol succinate ER 25 milliGRAM(s) Oral daily  montelukast 10 milliGRAM(s) Oral daily  predniSONE   Tablet 5 milliGRAM(s) Oral daily    MEDICATIONS  (PRN):  acetaminophen     Tablet .. 650 milliGRAM(s) Oral every 6 hours PRN Temp greater or equal to 38C (100.4F), Mild Pain (1 - 3)  aluminum hydroxide/magnesium hydroxide/simethicone Suspension 30 milliLiter(s) Oral every 4 hours PRN Dyspepsia  dextrose Oral Gel 15 Gram(s) Oral once PRN Blood Glucose LESS THAN 70 milliGRAM(s)/deciliter  melatonin 3 milliGRAM(s) Oral at bedtime PRN Insomnia  ondansetron Injectable 4 milliGRAM(s) IV Push every 8 hours PRN Nausea and/or Vomiting      Vital Signs Last 24 Hrs  T(C): 36.7 (01 Mar 2023 05:28), Max: 37 (2023 17:23)  T(F): 98 (01 Mar 2023 05:28), Max: 98.6 (2023 17:23)  HR: 86 (01 Mar 2023 08:45) (75 - 91)  BP: 166/86 (01 Mar 2023 08:45) (128/87 - 190/78)  BP(mean): --  RR: 14 (01 Mar 2023 08:45) (14 - 20)  SpO2: 99% (01 Mar 2023 08:45) (96% - 100%)    Parameters below as of 01 Mar 2023 08:45  Patient On (Oxygen Delivery Method): room air      CAPILLARY BLOOD GLUCOSE      POCT Blood Glucose.: 106 mg/dL (01 Mar 2023 09:58)  POCT Blood Glucose.: 94 mg/dL (01 Mar 2023 05:20)  POCT Blood Glucose.: 161 mg/dL (2023 17:34)    I&O's Summary      PHYSICAL EXAM:  GENERAL: NAD, well-developed, pt scratching herself during the interview   HEAD:  Atraumatic, Normocephalic  EYES: EOMI, PERRLA, conjunctiva and sclera clear  NECK: Supple, No JVD  CHEST/LUNG: Clear to auscultation bilaterally; No wheeze  HEART: Regular rate and rhythm; No murmurs, rubs, or gallops  ABDOMEN: Soft, Nontender, Nondistended; Bowel sounds present  EXTREMITIES:  2+ Peripheral Pulses, No clubbing, cyanosis, or edema  PSYCH: AAOx3, aware of being in hospital , does not know the name, but able to state month, date and day correctly   NEUROLOGY: non-focal  SKIN: +Maculopapular rash on b/l arms and back with multiple areas of excoriations,   LABS:                        11.4   5.60  )-----------( 174      ( 01 Mar 2023 05:15 )             38.2     03-01    143  |  106  |  16  ----------------------------<  93  3.8   |  24  |  0.86    Ca    9.7      01 Mar 2023 05:15  Phos  2.9     03-01  Mg     1.70         TPro  8.1  /  Alb  4.1  /  TBili  0.6  /  DBili  x   /  AST  32  /  ALT  16  /  AlkPhos  70        CARDIAC MARKERS ( 2023 19:18 )  x     / x     / 142 U/L / x     / x          Urinalysis Basic - ( 2023 19:18 )    Color: Light Yellow / Appearance: Clear / S.016 / pH: x  Gluc: x / Ketone: Negative  / Bili: Negative / Urobili: <2 mg/dL   Blood: x / Protein: Trace / Nitrite: Negative   Leuk Esterase: Large / RBC: 4 /HPF / WBC 15 /HPF   Sq Epi: x / Non Sq Epi: 11 /HPF / Bacteria: Negative        RADIOLOGY & ADDITIONAL TESTS:    Imaging Personally Reviewed:    Consultant(s) Notes Reviewed:  Neurology,  Dermatology     Care Discussed with Consultants/Other Providers: Dermatology    Patient is a 87y old  Female who presents with a chief complaint of Syncope (2023 23:44)      SUBJECTIVE / OVERNIGHT EVENTS: patient seen and examined by bedside ,Pt c/o itching generalized. Informed by staff that pt pulled out EEG and Telemetry  leads , pt says had headache before, now resolved, denies, dizziness, SOB, CP, Palpitations , N/V/D, abdominal pain  History obtained with Croatian translation     MEDICATIONS  (STANDING):  aspirin enteric coated 81 milliGRAM(s) Oral daily  budesonide  80 MICROgram(s)/formoterol 4.5 MICROgram(s) Inhaler 2 Puff(s) Inhalation two times a day  calamine/zinc oxide Lotion 1 Application(s) Topical two times a day  dextrose 5%. 1000 milliLiter(s) (50 mL/Hr) IV Continuous <Continuous>  dextrose 5%. 1000 milliLiter(s) (100 mL/Hr) IV Continuous <Continuous>  dextrose 50% Injectable 25 Gram(s) IV Push once  dextrose 50% Injectable 12.5 Gram(s) IV Push once  dextrose 50% Injectable 25 Gram(s) IV Push once  glucagon  Injectable 1 milliGRAM(s) IntraMuscular once  influenza  Vaccine (HIGH DOSE) 0.7 milliLiter(s) IntraMuscular once  insulin lispro (ADMELOG) corrective regimen sliding scale   SubCutaneous three times a day before meals  insulin lispro (ADMELOG) corrective regimen sliding scale   SubCutaneous at bedtime  levETIRAcetam 500 milliGRAM(s) Oral two times a day  metoprolol succinate ER 25 milliGRAM(s) Oral daily  montelukast 10 milliGRAM(s) Oral daily  predniSONE   Tablet 5 milliGRAM(s) Oral daily    MEDICATIONS  (PRN):  acetaminophen     Tablet .. 650 milliGRAM(s) Oral every 6 hours PRN Temp greater or equal to 38C (100.4F), Mild Pain (1 - 3)  aluminum hydroxide/magnesium hydroxide/simethicone Suspension 30 milliLiter(s) Oral every 4 hours PRN Dyspepsia  dextrose Oral Gel 15 Gram(s) Oral once PRN Blood Glucose LESS THAN 70 milliGRAM(s)/deciliter  melatonin 3 milliGRAM(s) Oral at bedtime PRN Insomnia  ondansetron Injectable 4 milliGRAM(s) IV Push every 8 hours PRN Nausea and/or Vomiting      Vital Signs Last 24 Hrs  T(C): 36.7 (01 Mar 2023 05:28), Max: 37 (2023 17:23)  T(F): 98 (01 Mar 2023 05:28), Max: 98.6 (2023 17:23)  HR: 86 (01 Mar 2023 08:45) (75 - 91)  BP: 166/86 (01 Mar 2023 08:45) (128/87 - 190/78)  BP(mean): --  RR: 14 (01 Mar 2023 08:45) (14 - 20)  SpO2: 99% (01 Mar 2023 08:45) (96% - 100%)    Parameters below as of 01 Mar 2023 08:45  Patient On (Oxygen Delivery Method): room air      CAPILLARY BLOOD GLUCOSE      POCT Blood Glucose.: 106 mg/dL (01 Mar 2023 09:58)  POCT Blood Glucose.: 94 mg/dL (01 Mar 2023 05:20)  POCT Blood Glucose.: 161 mg/dL (2023 17:34)    I&O's Summary      PHYSICAL EXAM:  GENERAL: NAD, well-developed, pt scratching herself during the interview   HEAD:  Atraumatic, Normocephalic  EYES: EOMI, PERRLA, conjunctiva and sclera clear  NECK: Supple, No JVD  CHEST/LUNG: Clear to auscultation bilaterally; No wheeze  HEART: Regular rate and rhythm; No murmurs, rubs, or gallops  ABDOMEN: Soft, Nontender, Nondistended; Bowel sounds present  EXTREMITIES:  2+ Peripheral Pulses, No clubbing, cyanosis, or edema  PSYCH: AAOx3, aware of being in hospital , does not know the name, but able to state month, date and day correctly   NEUROLOGY: non-focal  SKIN: +Maculopapular rash on b/l arms and back with multiple areas of excoriations,   LABS:                        11.4   5.60  )-----------( 174      ( 01 Mar 2023 05:15 )             38.2     03-01    143  |  106  |  16  ----------------------------<  93  3.8   |  24  |  0.86    Ca    9.7      01 Mar 2023 05:15  Phos  2.9     03-01  Mg     1.70         TPro  8.1  /  Alb  4.1  /  TBili  0.6  /  DBili  x   /  AST  32  /  ALT  16  /  AlkPhos  70        CARDIAC MARKERS ( 2023 19:18 )  x     / x     / 142 U/L / x     / x          Urinalysis Basic - ( 2023 19:18 )    Color: Light Yellow / Appearance: Clear / S.016 / pH: x  Gluc: x / Ketone: Negative  / Bili: Negative / Urobili: <2 mg/dL   Blood: x / Protein: Trace / Nitrite: Negative   Leuk Esterase: Large / RBC: 4 /HPF / WBC 15 /HPF   Sq Epi: x / Non Sq Epi: 11 /HPF / Bacteria: Negative        RADIOLOGY & ADDITIONAL TESTS:    Imaging Personally Reviewed:    Consultant(s) Notes Reviewed:  Neurology,  Dermatology     Care Discussed with Consultants/Other Providers: Dermatology

## 2023-03-01 NOTE — CONSULT NOTE ADULT - ATTENDING COMMENTS
Diffuse pruritus and excoriations, favoring skin folds and hands. Exam limited by calamine lotion. However in absence of drug culprit and presence of eosinophilia, agree with treating for scabies.

## 2023-03-01 NOTE — PHYSICAL THERAPY INITIAL EVALUATION ADULT - PATIENT PROFILE REVIEW, REHAB EVAL
ACTIVITY: OOB to Chair/OOB with Assistance; Spoke with ED RN Eli Gurrola prior to PT evaluation--> Pt OK for PT consult/OOB activity./yes

## 2023-03-01 NOTE — PROGRESS NOTE ADULT - PROBLEM SELECTOR PLAN 5
- Place on ISS, FS qAC, CC diet  - Patient unsure of her medications, reconciled as per SureScripts and ED note, med history pharmacist emailed for help in her medication reconciliation - Place on ISS, FS qAC, CC diet  -AIc 6.0   Pt on Januvia at home, will hold for now   -

## 2023-03-01 NOTE — ED ADULT NURSE REASSESSMENT NOTE - NS ED NURSE REASSESS COMMENT FT1
Pt changed, turned, and repositioned. Skin discoloration noted to sacrum.
Pt presents restless and slightly agitated. PRN Melatonin given. Sinus rhythm on cardiac monitor. Airway is patent. Respirations are even and unlabored, no signs of respiratory distress.
pt has MAD noted to sacrum, area was cleaned and pat dried. pt is stable at this time.
pt received from night RN, VSS, pt seen by neuro team, plan for EEG. pt aax3 at this time, itching, comfort measures applied. pt pt speaking with admission RN at this time. will ctm
Report received from JOSÉ ANTONIO Pettit: Pt A&Ox4, respirations equal and unlabored. Pt resting in stretcher at this time of no complaints. IV patent. VSS. Pt awaiting inpatient bed assignment. No acute distress noted.
pt resting comfortably, safety maintained. NAD noted. respirations even, nonlabored. no seizure like activity noted. no requests at this time.
Pt a&ox3, denies pain at this time. Medicated as per MD orders. Labs collected and sent off. Primafit applied. Stage 1 pressure ulcer noted to sacral region. Respirations are even and unlabored, no signs of respiratory distress.

## 2023-03-01 NOTE — PATIENT PROFILE ADULT - FALL HARM RISK - HARM RISK INTERVENTIONS
Assistance with ambulation/Assistance OOB with selected safe patient handling equipment/Communicate Risk of Fall with Harm to all staff/Discuss with provider need for PT consult/Monitor gait and stability/Provide patient with walking aids - walker, cane, crutches/Reinforce activity limits and safety measures with patient and family/Tailored Fall Risk Interventions/Visual Cue: Yellow wristband and red socks/Bed in lowest position, wheels locked, appropriate side rails in place/Call bell, personal items and telephone in reach/Instruct patient to call for assistance before getting out of bed or chair/Non-slip footwear when patient is out of bed/[place] to call system/Physically safe environment - no spills, clutter or unnecessary equipment/Purposeful Proactive Rounding/Room/bathroom lighting operational, light cord in reach Assistance with ambulation/Assistance OOB with selected safe patient handling equipment/Communicate Risk of Fall with Harm to all staff/Discuss with provider need for PT consult/Monitor gait and stability/Provide patient with walking aids - walker, cane, crutches/Reinforce activity limits and safety measures with patient and family/Tailored Fall Risk Interventions/Visual Cue: Yellow wristband and red socks/Bed in lowest position, wheels locked, appropriate side rails in place/Call bell, personal items and telephone in reach/Instruct patient to call for assistance before getting out of bed or chair/Non-slip footwear when patient is out of bed/[place] to call system/Physically safe environment - no spills, clutter or unnecessary equipment/Purposeful Proactive Rounding/Room/bathroom lighting operational, light cord in reach Assistance with ambulation/Assistance OOB with selected safe patient handling equipment/Communicate Risk of Fall with Harm to all staff/Discuss with provider need for PT consult/Monitor gait and stability/Provide patient with walking aids - walker, cane, crutches/Reinforce activity limits and safety measures with patient and family/Tailored Fall Risk Interventions/Visual Cue: Yellow wristband and red socks/Bed in lowest position, wheels locked, appropriate side rails in place/Call bell, personal items and telephone in reach/Instruct patient to call for assistance before getting out of bed or chair/Non-slip footwear when patient is out of bed/[place] to call system/Physically safe environment - no spills, clutter or unnecessary equipment/Purposeful Proactive Rounding/Room/bathroom lighting operational, light cord in reach

## 2023-03-01 NOTE — PROGRESS NOTE ADULT - PROBLEM SELECTOR PLAN 8
- Patient unsure of her medications, reconciled as per [employer] and ED note, med history pharmacist emailed for help in her medication reconciliation - Patient on Leflunomide and Prednisone  Prednisone resumed

## 2023-03-01 NOTE — PROGRESS NOTE ADULT - PROBLEM SELECTOR PLAN 2
- c/w home keppra 500mg BID  - obtain routine EEG, seizure precautions  - f/u further neurology recommendations

## 2023-03-01 NOTE — PHYSICAL THERAPY INITIAL EVALUATION ADULT - CRITERIA FOR SKILLED THERAPEUTIC INTERVENTIONS
Class I (easy) - visualization of the soft palate, fauces, uvula, and both anterior and posterior pillars
impairments found/functional limitations in following categories/risk reduction/prevention/rehab potential

## 2023-03-01 NOTE — PHYSICAL THERAPY INITIAL EVALUATION ADULT - ADDITIONAL COMMENTS
Information regarding pt's prior level of function was confirmed with pt's daughter @ bedside. Pt lives in an apartment with her son; elevator inside. Prior to hospital admission pt required assistance with ambulation using a rolling walker.    Pt left comfortable in bed, NAD, all lines intact, all precautions maintained, with call bell in reach, bed alarm on, daughter @ bedside, and RN aware of PT evaluation.

## 2023-03-02 LAB
GLUCOSE BLDC GLUCOMTR-MCNC: 104 MG/DL — HIGH (ref 70–99)
GLUCOSE BLDC GLUCOMTR-MCNC: 114 MG/DL — HIGH (ref 70–99)
GLUCOSE BLDC GLUCOMTR-MCNC: 116 MG/DL — HIGH (ref 70–99)
GLUCOSE BLDC GLUCOMTR-MCNC: 97 MG/DL — SIGNIFICANT CHANGE UP (ref 70–99)

## 2023-03-02 PROCEDURE — 95720 EEG PHY/QHP EA INCR W/VEEG: CPT

## 2023-03-02 PROCEDURE — 99233 SBSQ HOSP IP/OBS HIGH 50: CPT

## 2023-03-02 RX ORDER — ACETAMINOPHEN 500 MG
650 TABLET ORAL EVERY 6 HOURS
Refills: 0 | Status: DISCONTINUED | OUTPATIENT
Start: 2023-03-02 | End: 2023-03-03

## 2023-03-02 RX ORDER — CEFTRIAXONE 500 MG/1
1000 INJECTION, POWDER, FOR SOLUTION INTRAMUSCULAR; INTRAVENOUS EVERY 24 HOURS
Refills: 0 | Status: COMPLETED | OUTPATIENT
Start: 2023-03-02 | End: 2023-03-03

## 2023-03-02 RX ADMIN — Medication 1 APPLICATION(S): at 18:31

## 2023-03-02 RX ADMIN — CALAMINE AND ZINC OXIDE AND PHENOL 1 APPLICATION(S): 160; 10 LOTION TOPICAL at 06:27

## 2023-03-02 RX ADMIN — Medication 5 MILLIGRAM(S): at 06:26

## 2023-03-02 RX ADMIN — LEVETIRACETAM 500 MILLIGRAM(S): 250 TABLET, FILM COATED ORAL at 06:26

## 2023-03-02 RX ADMIN — Medication 1 APPLICATION(S): at 18:33

## 2023-03-02 RX ADMIN — BUDESONIDE AND FORMOTEROL FUMARATE DIHYDRATE 2 PUFF(S): 160; 4.5 AEROSOL RESPIRATORY (INHALATION) at 21:47

## 2023-03-02 RX ADMIN — Medication 81 MILLIGRAM(S): at 12:59

## 2023-03-02 RX ADMIN — LORATADINE 10 MILLIGRAM(S): 10 TABLET ORAL at 21:49

## 2023-03-02 RX ADMIN — CEFTRIAXONE 100 MILLIGRAM(S): 500 INJECTION, POWDER, FOR SOLUTION INTRAMUSCULAR; INTRAVENOUS at 13:01

## 2023-03-02 RX ADMIN — CALAMINE AND ZINC OXIDE AND PHENOL 1 APPLICATION(S): 160; 10 LOTION TOPICAL at 18:26

## 2023-03-02 RX ADMIN — Medication 1 APPLICATION(S): at 06:27

## 2023-03-02 RX ADMIN — Medication 650 MILLIGRAM(S): at 16:08

## 2023-03-02 RX ADMIN — Medication 25 MILLIGRAM(S): at 06:26

## 2023-03-02 RX ADMIN — LEVETIRACETAM 500 MILLIGRAM(S): 250 TABLET, FILM COATED ORAL at 18:25

## 2023-03-02 RX ADMIN — MONTELUKAST 10 MILLIGRAM(S): 4 TABLET, CHEWABLE ORAL at 12:59

## 2023-03-02 RX ADMIN — Medication 650 MILLIGRAM(S): at 17:45

## 2023-03-02 RX ADMIN — BUDESONIDE AND FORMOTEROL FUMARATE DIHYDRATE 2 PUFF(S): 160; 4.5 AEROSOL RESPIRATORY (INHALATION) at 09:45

## 2023-03-02 NOTE — PROGRESS NOTE ADULT - PROBLEM SELECTOR PLAN 1
- Patient with a few episodes of LOC but unclear on cause, did report having palpitations prior to her episodes so higher suspicion for cardiac cause but as per ED note patient's son reported her having seizures  - Neurology evaluation appreciated with check a routine EEG  - Check orthostatics  - Monitor on telemetry  - Monitor for further episodes of LOC  - Fall, aspiration, and seizure precautions - Patient with a few episodes of LOC but unclear on cause, did report having palpitations prior to her episodes so higher suspicion for cardiac cause but as per ED note patient's son reported her having seizures  - Neurology evaluation appreciated   -VEEG in progress   - Check orthostatics  - Monitor on telemetry  - Monitor for further episodes of LOC  - Fall, aspiration, and seizure precautions

## 2023-03-02 NOTE — PROGRESS NOTE ADULT - PROBLEM SELECTOR PLAN 6
- Pt on Losartan 100 mg and Toprol XL 25 mg   - not sure if patient compliant with medications,  toprol  resumed , /72 - Pt on Losartan 100 mg and Toprol XL 25 mg   - not sure if patient compliant with medications,  toprol  resumed , BP within goal

## 2023-03-02 NOTE — PROGRESS NOTE ADULT - PROBLEM SELECTOR PLAN 4
- Reports having had dysuria and urinary frequency recently but unable to state when, denies any urinary complaints at present  - s/p ceftriaxone in ED, as per ED note was on keflex as outpatient  - Here UA with pyuria but also with some epithelial cells, unclear if patient still with UTI or if pyuria residual from treated UTI  - Given her eosinophilia and new pruritic rash with recent abx use concern that she might be having a drug reaction ->  for now will hold off on further abx, f/u UCx - Reports having had dysuria and urinary frequency recently but unable to state when, denies any urinary complaints at present  - s/p ceftriaxone in ED, as per ED note was on keflex as outpatient  - Here UA with pyuria but also with some epithelial cells, unclear if patient still with UTI or if pyuria residual from treated UTI  - will give CTX for 2 more days to complete 3 day course

## 2023-03-02 NOTE — EEG REPORT - NS EEG TEXT BOX
[facility]   COMPREHENSIVE EPILEPSY CENTER   REPORT OF CONTINUOUS VIDEO EEG     [facility]: [address], [place] [address], Ph#: [phone]  [facility]: [address] [place] [address], Ph#: [phone]  [facility]: [address] [place] [address], Ph#: [phone]    Patient Name: SULAIMAN PELAEZ  Age and : 87y (37)  MRN #: [id number]  Location: [place] [address] A  Referring Physician: Russ Yap    Start Time/Date: 3/1/2023 1147AM  End Time/Date: 08:00 on 23  Duration: 20H    _____________________________________________________________  STUDY INFORMATION    EEG Recording Technique:  The patient underwent continuous Video-EEG monitoring, using Telemetry System hardware on the XLTek Digital System. EEG and video data were stored on a computer hard drive with important events saved in digital archive files. The material was reviewed by a physician (electroencephalographer / epileptologist) on a daily basis. Farshad and seizure detection algorithms were utilized and reviewed. An EEG Technician attended to the patient, and was available throughout daytime work hours.  The epilepsy center neurologist was available in person or on call 24-hours per day.    EEG Placement and Labeling of Electrodes:  The EEG was performed utilizing 20 channel referential EEG connections (coronal over temporal over parasagittal montage) using all standard 10-20 electrode placements with EKG, with additional electrodes placed in the inferior temporal region using the modified 10-10 montage electrode placements for elective admissions, or if deemed necessary. Recording was at a sampling rate of 256 samples per second per channel. Time synchronized digital video recording was done simultaneously with EEG recording. A low light infrared camera was used for low light recording.     _____________________________________________________________  HISTORY    Patient is a 87y old  Female who presents with a chief complaint of Syncope (01 Mar 2023 13:08)      PERTINENT MEDICATION:  MEDICATIONS  (STANDING):  aspirin enteric coated 81 milliGRAM(s) Oral daily  budesonide  80 MICROgram(s)/formoterol 4.5 MICROgram(s) Inhaler 2 Puff(s) Inhalation two times a day  calamine/zinc oxide Lotion 1 Application(s) Topical two times a day  clobetasol 0.05% Ointment 1 Application(s) Topical two times a day  dextrose 5%. 1000 milliLiter(s) (50 mL/Hr) IV Continuous <Continuous>  dextrose 5%. 1000 milliLiter(s) (100 mL/Hr) IV Continuous <Continuous>  dextrose 50% Injectable 25 Gram(s) IV Push once  dextrose 50% Injectable 12.5 Gram(s) IV Push once  dextrose 50% Injectable 25 Gram(s) IV Push once  glucagon  Injectable 1 milliGRAM(s) IntraMuscular once  hydrocortisone 1% Ointment 1 Application(s) Topical two times a day  influenza  Vaccine (HIGH DOSE) 0.7 milliLiter(s) IntraMuscular once  insulin lispro (ADMELOG) corrective regimen sliding scale   SubCutaneous three times a day before meals  insulin lispro (ADMELOG) corrective regimen sliding scale   SubCutaneous at bedtime  levETIRAcetam 500 milliGRAM(s) Oral two times a day  metoprolol succinate ER 25 milliGRAM(s) Oral daily  montelukast 10 milliGRAM(s) Oral daily  predniSONE   Tablet 5 milliGRAM(s) Oral daily    _____________________________________________________________  STUDY INTERPRETATION    Findings: The background was continuous, spontaneously variable and reactive. During wakefulness, the posterior dominant rhythm consisted of symmetric, 8Hz activity, with amplitude to 30 uV, that attenuated to eye opening.  Low amplitude frontal beta was noted in wakefulness.    Background Slowing:  Excess diffuse irregular theta and rare polymorphic delta slowing.    Focal Slowing:   None were present.    Sleep Background:  Drowsiness was characterized by fragmentation, attenuation, and slowing of the background activity.    Sleep was characterized by the presence of vertex waves, symmetric sleep spindles and K-complexes.    Other Non-Epileptiform Findings:  None were present.    Interictal Epileptiform Activity:   None were present.    Events:  Clinical events: None recorded.  Seizures: None recorded.    Activation Procedures:   Hyperventilation was not performed.    Photic stimulation was not performed.     Artifacts:  Intermittent myogenic and movement artifacts were noted.  _____________________________________________________________  EEG SUMMARY/CLASSIFICATION    Abnormal EEG in the awake, drowsy and asleep states.  - Mild generalized slowing.    _____________________________________________________________  EEG IMPRESSION/CLINICAL CORRELATE  Abnormal EEG study.  Mild nonspecific diffuse or multifocal cerebral dysfunction.   No epileptiform pattern or seizure seen.    **In absence of additional clinical concerns, recommend consideration for discontinuation of current EEG study with reconnection in future if warranted.    Solo Cheng MD  EEG/Epilepsy Attending  [facility]   COMPREHENSIVE EPILEPSY CENTER   REPORT OF CONTINUOUS VIDEO EEG     [facility]: [address], [place] [address], Ph#: [phone]  [facility]: [address] [address], Ph#: [phone]  [facility]: [address] [place] [address], Ph#: [phone]    Patient Name: SULAIMAN PELAEZ  Age and : 87y (37)  MRN #: [id number]  Location: [place] [address] A  Referring Physician: Russ Yap    Start Time/Date: 3/1/2023 1147AM  End Time/Date: 08:00 on 23  Duration: 20H    _____________________________________________________________  STUDY INFORMATION    EEG Recording Technique:  The patient underwent continuous Video-EEG monitoring, using Telemetry System hardware on the XLTek Digital System. EEG and video data were stored on a computer hard drive with important events saved in digital archive files. The material was reviewed by a physician (electroencephalographer / epileptologist) on a daily basis. Farshad and seizure detection algorithms were utilized and reviewed. An EEG Technician attended to the patient, and was available throughout daytime work hours.  The epilepsy center neurologist was available in person or on call 24-hours per day.    EEG Placement and Labeling of Electrodes:  The EEG was performed utilizing 20 channel referential EEG connections (coronal over temporal over parasagittal montage) using all standard 10-20 electrode placements with EKG, with additional electrodes placed in the inferior temporal region using the modified 10-10 montage electrode placements for elective admissions, or if deemed necessary. Recording was at a sampling rate of 256 samples per second per channel. Time synchronized digital video recording was done simultaneously with EEG recording. A low light infrared camera was used for low light recording.     _____________________________________________________________  HISTORY    Patient is a 87y old  Female who presents with a chief complaint of Syncope (01 Mar 2023 13:08)      PERTINENT MEDICATION:  MEDICATIONS  (STANDING):  aspirin enteric coated 81 milliGRAM(s) Oral daily  budesonide  80 MICROgram(s)/formoterol 4.5 MICROgram(s) Inhaler 2 Puff(s) Inhalation two times a day  calamine/zinc oxide Lotion 1 Application(s) Topical two times a day  clobetasol 0.05% Ointment 1 Application(s) Topical two times a day  dextrose 5%. 1000 milliLiter(s) (50 mL/Hr) IV Continuous <Continuous>  dextrose 5%. 1000 milliLiter(s) (100 mL/Hr) IV Continuous <Continuous>  dextrose 50% Injectable 25 Gram(s) IV Push once  dextrose 50% Injectable 12.5 Gram(s) IV Push once  dextrose 50% Injectable 25 Gram(s) IV Push once  glucagon  Injectable 1 milliGRAM(s) IntraMuscular once  hydrocortisone 1% Ointment 1 Application(s) Topical two times a day  influenza  Vaccine (HIGH DOSE) 0.7 milliLiter(s) IntraMuscular once  insulin lispro (ADMELOG) corrective regimen sliding scale   SubCutaneous three times a day before meals  insulin lispro (ADMELOG) corrective regimen sliding scale   SubCutaneous at bedtime  levETIRAcetam 500 milliGRAM(s) Oral two times a day  metoprolol succinate ER 25 milliGRAM(s) Oral daily  montelukast 10 milliGRAM(s) Oral daily  predniSONE   Tablet 5 milliGRAM(s) Oral daily    _____________________________________________________________  STUDY INTERPRETATION    Findings: The background was continuous, spontaneously variable and reactive. During wakefulness, the posterior dominant rhythm consisted of symmetric, 8Hz activity, with amplitude to 30 uV, that attenuated to eye opening.  Low amplitude frontal beta was noted in wakefulness.    Background Slowing:  Excess diffuse irregular theta and rare polymorphic delta slowing.    Focal Slowing:   None were present.    Sleep Background:  Drowsiness was characterized by fragmentation, attenuation, and slowing of the background activity.    Sleep was characterized by the presence of vertex waves, symmetric sleep spindles and K-complexes.    Other Non-Epileptiform Findings:  None were present.    Interictal Epileptiform Activity:   None were present.    Events:  Clinical events: None recorded.  Seizures: None recorded.    Activation Procedures:   Hyperventilation was not performed.    Photic stimulation was not performed.     Artifacts:  Intermittent myogenic and movement artifacts were noted.  _____________________________________________________________  EEG SUMMARY/CLASSIFICATION    Abnormal EEG in the awake, drowsy and asleep states.  - Mild generalized slowing.    _____________________________________________________________  EEG IMPRESSION/CLINICAL CORRELATE  Abnormal EEG study.  Mild nonspecific diffuse or multifocal cerebral dysfunction.   No epileptiform pattern or seizure seen.    **In absence of additional clinical concerns, recommend consideration for discontinuation of current EEG study with reconnection in future if warranted.    Solo Cheng MD  EEG/Epilepsy Attending  [facility]   COMPREHENSIVE EPILEPSY CENTER   REPORT OF CONTINUOUS VIDEO EEG     [facility]: [address], [place] [address], Ph#: [phone]  [facility]: [address] [address], Ph#: [phone]  [facility]: [address] [place] [address], Ph#: [phone]    Patient Name: SULAIMAN PELAEZ  Age and : 87y (37)  MRN #: [id number]  Location: [place] [address] A  Referring Physician: Russ Yap    Start Time/Date: 3/1/2023 1147AM  End Time/Date: 08:00 on 23  Duration: 20H    _____________________________________________________________  STUDY INFORMATION    EEG Recording Technique:  The patient underwent continuous Video-EEG monitoring, using Telemetry System hardware on the XLTek Digital System. EEG and video data were stored on a computer hard drive with important events saved in digital archive files. The material was reviewed by a physician (electroencephalographer / epileptologist) on a daily basis. Farshad and seizure detection algorithms were utilized and reviewed. An EEG Technician attended to the patient, and was available throughout daytime work hours.  The epilepsy center neurologist was available in person or on call 24-hours per day.    EEG Placement and Labeling of Electrodes:  The EEG was performed utilizing 20 channel referential EEG connections (coronal over temporal over parasagittal montage) using all standard 10-20 electrode placements with EKG, with additional electrodes placed in the inferior temporal region using the modified 10-10 montage electrode placements for elective admissions, or if deemed necessary. Recording was at a sampling rate of 256 samples per second per channel. Time synchronized digital video recording was done simultaneously with EEG recording. A low light infrared camera was used for low light recording.     _____________________________________________________________  HISTORY    Patient is a 87y old  Female who presents with a chief complaint of Syncope (01 Mar 2023 13:08)      PERTINENT MEDICATION:  MEDICATIONS  (STANDING):  aspirin enteric coated 81 milliGRAM(s) Oral daily  budesonide  80 MICROgram(s)/formoterol 4.5 MICROgram(s) Inhaler 2 Puff(s) Inhalation two times a day  calamine/zinc oxide Lotion 1 Application(s) Topical two times a day  clobetasol 0.05% Ointment 1 Application(s) Topical two times a day  dextrose 5%. 1000 milliLiter(s) (50 mL/Hr) IV Continuous <Continuous>  dextrose 5%. 1000 milliLiter(s) (100 mL/Hr) IV Continuous <Continuous>  dextrose 50% Injectable 25 Gram(s) IV Push once  dextrose 50% Injectable 12.5 Gram(s) IV Push once  dextrose 50% Injectable 25 Gram(s) IV Push once  glucagon  Injectable 1 milliGRAM(s) IntraMuscular once  hydrocortisone 1% Ointment 1 Application(s) Topical two times a day  influenza  Vaccine (HIGH DOSE) 0.7 milliLiter(s) IntraMuscular once  insulin lispro (ADMELOG) corrective regimen sliding scale   SubCutaneous three times a day before meals  insulin lispro (ADMELOG) corrective regimen sliding scale   SubCutaneous at bedtime  levETIRAcetam 500 milliGRAM(s) Oral two times a day  metoprolol succinate ER 25 milliGRAM(s) Oral daily  montelukast 10 milliGRAM(s) Oral daily  predniSONE   Tablet 5 milliGRAM(s) Oral daily    _____________________________________________________________  STUDY INTERPRETATION    Findings: The background was continuous, spontaneously variable and reactive. During wakefulness, the posterior dominant rhythm consisted of symmetric, 8Hz activity, with amplitude to 30 uV, that attenuated to eye opening.  Low amplitude frontal beta was noted in wakefulness.    Background Slowing:  Excess diffuse irregular theta and rare polymorphic delta slowing.    Focal Slowing:   None were present.    Sleep Background:  Drowsiness was characterized by fragmentation, attenuation, and slowing of the background activity.    Sleep was characterized by the presence of vertex waves, symmetric sleep spindles and K-complexes.    Other Non-Epileptiform Findings:  None were present.    Interictal Epileptiform Activity:   None were present.    Events:  Clinical events: None recorded.  Seizures: None recorded.    Activation Procedures:   Hyperventilation was not performed.    Photic stimulation was not performed.     Artifacts:  Intermittent myogenic and movement artifacts were noted.  _____________________________________________________________  EEG SUMMARY/CLASSIFICATION    Abnormal EEG in the awake, drowsy and asleep states.  - Mild generalized slowing.    _____________________________________________________________  EEG IMPRESSION/CLINICAL CORRELATE  Abnormal EEG study.  Mild nonspecific diffuse or multifocal cerebral dysfunction.   No epileptiform pattern or seizure seen.    **In absence of additional clinical concerns, recommend consideration for discontinuation of current EEG study with reconnection in future if warranted.    Solo Cheng MD  EEG/Epilepsy Attending

## 2023-03-02 NOTE — PROGRESS NOTE ADULT - SUBJECTIVE AND OBJECTIVE BOX
Patient is a 87y old  Female who presents with a chief complaint of Syncope (01 Mar 2023 13:08)      SUBJECTIVE / OVERNIGHT EVENTS:    MEDICATIONS  (STANDING):  aspirin enteric coated 81 milliGRAM(s) Oral daily  budesonide  80 MICROgram(s)/formoterol 4.5 MICROgram(s) Inhaler 2 Puff(s) Inhalation two times a day  calamine/zinc oxide Lotion 1 Application(s) Topical two times a day  clobetasol 0.05% Ointment 1 Application(s) Topical two times a day  dextrose 5%. 1000 milliLiter(s) (50 mL/Hr) IV Continuous <Continuous>  dextrose 5%. 1000 milliLiter(s) (100 mL/Hr) IV Continuous <Continuous>  dextrose 50% Injectable 25 Gram(s) IV Push once  dextrose 50% Injectable 12.5 Gram(s) IV Push once  dextrose 50% Injectable 25 Gram(s) IV Push once  glucagon  Injectable 1 milliGRAM(s) IntraMuscular once  hydrocortisone 1% Ointment 1 Application(s) Topical two times a day  influenza  Vaccine (HIGH DOSE) 0.7 milliLiter(s) IntraMuscular once  insulin lispro (ADMELOG) corrective regimen sliding scale   SubCutaneous three times a day before meals  insulin lispro (ADMELOG) corrective regimen sliding scale   SubCutaneous at bedtime  levETIRAcetam 500 milliGRAM(s) Oral two times a day  metoprolol succinate ER 25 milliGRAM(s) Oral daily  montelukast 10 milliGRAM(s) Oral daily  predniSONE   Tablet 5 milliGRAM(s) Oral daily    MEDICATIONS  (PRN):  acetaminophen     Tablet .. 650 milliGRAM(s) Oral every 6 hours PRN Temp greater or equal to 38C (100.4F), Mild Pain (1 - 3)  aluminum hydroxide/magnesium hydroxide/simethicone Suspension 30 milliLiter(s) Oral every 4 hours PRN Dyspepsia  dextrose Oral Gel 15 Gram(s) Oral once PRN Blood Glucose LESS THAN 70 milliGRAM(s)/deciliter  loratadine 10 milliGRAM(s) Oral two times a day PRN itching  melatonin 3 milliGRAM(s) Oral at bedtime PRN Insomnia  ondansetron Injectable 4 milliGRAM(s) IV Push every 8 hours PRN Nausea and/or Vomiting      Vital Signs Last 24 Hrs  T(C): 36.7 (02 Mar 2023 06:15), Max: 36.8 (01 Mar 2023 16:39)  T(F): 98 (02 Mar 2023 06:15), Max: 98.2 (01 Mar 2023 16:39)  HR: 82 (02 Mar 2023 06:15) (74 - 88)  BP: 133/69 (02 Mar 2023 06:15) (132/62 - 146/68)  BP(mean): --  RR: 16 (02 Mar 2023 06:15) (15 - 20)  SpO2: 100% (02 Mar 2023 06:15) (98% - 100%)    Parameters below as of 02 Mar 2023 06:15  Patient On (Oxygen Delivery Method): room air      CAPILLARY BLOOD GLUCOSE      POCT Blood Glucose.: 114 mg/dL (02 Mar 2023 08:43)  POCT Blood Glucose.: 88 mg/dL (01 Mar 2023 21:44)  POCT Blood Glucose.: 107 mg/dL (01 Mar 2023 17:43)  POCT Blood Glucose.: 94 mg/dL (01 Mar 2023 14:19)  POCT Blood Glucose.: 106 mg/dL (01 Mar 2023 09:58)    I&O's Summary    01 Mar 2023 07:01  -  02 Mar 2023 07:00  --------------------------------------------------------  IN: 300 mL / OUT: 0 mL / NET: 300 mL        PHYSICAL EXAM:  GENERAL: NAD, well-developed  HEAD:  Atraumatic, Normocephalic  EYES: EOMI, PERRLA, conjunctiva and sclera clear  NECK: Supple, No JVD  CHEST/LUNG: Clear to auscultation bilaterally; No wheeze  HEART: Regular rate and rhythm; No murmurs, rubs, or gallops  ABDOMEN: Soft, Nontender, Nondistended; Bowel sounds present  EXTREMITIES:  2+ Peripheral Pulses, No clubbing, cyanosis, or edema  PSYCH: AAOx3  NEUROLOGY: non-focal  SKIN: No rashes or lesions    LABS:                        11.4   5.60  )-----------( 174      ( 01 Mar 2023 05:15 )             38.2     03-    143  |  106  |  16  ----------------------------<  93  3.8   |  24  |  0.86    Ca    9.7      01 Mar 2023 05:15  Phos  2.9     -  Mg     1.70         TPro  8.1  /  Alb  4.1  /  TBili  0.6  /  DBili  x   /  AST  32  /  ALT  16  /  AlkPhos  70        CARDIAC MARKERS ( 2023 19:18 )  x     / x     / 142 U/L / x     / x          Urinalysis Basic - ( 2023 19:18 )    Color: Light Yellow / Appearance: Clear / S.016 / pH: x  Gluc: x / Ketone: Negative  / Bili: Negative / Urobili: <2 mg/dL   Blood: x / Protein: Trace / Nitrite: Negative   Leuk Esterase: Large / RBC: 4 /HPF / WBC 15 /HPF   Sq Epi: x / Non Sq Epi: 11 /HPF / Bacteria: Negative        RADIOLOGY & ADDITIONAL TESTS:    Imaging Personally Reviewed:    Consultant(s) Notes Reviewed:      Care Discussed with Consultants/Other Providers:   Patient is a 87y old  Female who presents with a chief complaint of Syncope (01 Mar 2023 13:08)      SUBJECTIVE / OVERNIGHT EVENTS: patient seen and examined by bedside, pt undergoing VEEG, denies headache, dizziness, SOB, CP, Palpitations , N/V/D, abdominal pain  rash less itchy today   Tele : NSR, no acute events     MEDICATIONS  (STANDING):  aspirin enteric coated 81 milliGRAM(s) Oral daily  budesonide  80 MICROgram(s)/formoterol 4.5 MICROgram(s) Inhaler 2 Puff(s) Inhalation two times a day  calamine/zinc oxide Lotion 1 Application(s) Topical two times a day  clobetasol 0.05% Ointment 1 Application(s) Topical two times a day  dextrose 5%. 1000 milliLiter(s) (50 mL/Hr) IV Continuous <Continuous>  dextrose 5%. 1000 milliLiter(s) (100 mL/Hr) IV Continuous <Continuous>  dextrose 50% Injectable 25 Gram(s) IV Push once  dextrose 50% Injectable 12.5 Gram(s) IV Push once  dextrose 50% Injectable 25 Gram(s) IV Push once  glucagon  Injectable 1 milliGRAM(s) IntraMuscular once  hydrocortisone 1% Ointment 1 Application(s) Topical two times a day  influenza  Vaccine (HIGH DOSE) 0.7 milliLiter(s) IntraMuscular once  insulin lispro (ADMELOG) corrective regimen sliding scale   SubCutaneous three times a day before meals  insulin lispro (ADMELOG) corrective regimen sliding scale   SubCutaneous at bedtime  levETIRAcetam 500 milliGRAM(s) Oral two times a day  metoprolol succinate ER 25 milliGRAM(s) Oral daily  montelukast 10 milliGRAM(s) Oral daily  predniSONE   Tablet 5 milliGRAM(s) Oral daily    MEDICATIONS  (PRN):  acetaminophen     Tablet .. 650 milliGRAM(s) Oral every 6 hours PRN Temp greater or equal to 38C (100.4F), Mild Pain (1 - 3)  aluminum hydroxide/magnesium hydroxide/simethicone Suspension 30 milliLiter(s) Oral every 4 hours PRN Dyspepsia  dextrose Oral Gel 15 Gram(s) Oral once PRN Blood Glucose LESS THAN 70 milliGRAM(s)/deciliter  loratadine 10 milliGRAM(s) Oral two times a day PRN itching  melatonin 3 milliGRAM(s) Oral at bedtime PRN Insomnia  ondansetron Injectable 4 milliGRAM(s) IV Push every 8 hours PRN Nausea and/or Vomiting      Vital Signs Last 24 Hrs  T(C): 36.7 (02 Mar 2023 06:15), Max: 36.8 (01 Mar 2023 16:39)  T(F): 98 (02 Mar 2023 06:15), Max: 98.2 (01 Mar 2023 16:39)  HR: 82 (02 Mar 2023 06:15) (74 - 88)  BP: 133/69 (02 Mar 2023 06:15) (132/62 - 146/68)  BP(mean): --  RR: 16 (02 Mar 2023 06:15) (15 - 20)  SpO2: 100% (02 Mar 2023 06:15) (98% - 100%)    Parameters below as of 02 Mar 2023 06:15  Patient On (Oxygen Delivery Method): room air      CAPILLARY BLOOD GLUCOSE      POCT Blood Glucose.: 114 mg/dL (02 Mar 2023 08:43)  POCT Blood Glucose.: 88 mg/dL (01 Mar 2023 21:44)  POCT Blood Glucose.: 107 mg/dL (01 Mar 2023 17:43)  POCT Blood Glucose.: 94 mg/dL (01 Mar 2023 14:19)  POCT Blood Glucose.: 106 mg/dL (01 Mar 2023 09:58)    I&O's Summary    01 Mar 2023 07:01  -  02 Mar 2023 07:00  --------------------------------------------------------  IN: 300 mL / OUT: 0 mL / NET: 300 mL      PHYSICAL EXAM:  GENERAL: NAD, well-developed,   HEAD:  Atraumatic, Normocephalic, VEEG porsche in place   EYES: EOMI, PERRLA, conjunctiva and sclera clear  NECK: Supple, No JVD  CHEST/LUNG: Clear to auscultation bilaterally; No wheeze  HEART: Regular rate and rhythm; No murmurs, rubs, or gallops  ABDOMEN: Soft, Nontender, Nondistended; Bowel sounds present  EXTREMITIES:  2+ Peripheral Pulses, No clubbing, cyanosis, or edema  PSYCH: AAOx3, aware of being in hospital , does not know the name, but able to state month, date and day correctly   NEUROLOGY: non-focal  SKIN: +Maculopapular rash on b/l arms and back with multiple areas of excoriations ,     LABS:                        11.4   5.60  )-----------( 174      ( 01 Mar 2023 05:15 )             38.2     03-    143  |  106  |  16  ----------------------------<  93  3.8   |  24  |  0.86    Ca    9.7      01 Mar 2023 05:15  Phos  2.9     03-  Mg     1.70     -    TPro  8.1  /  Alb  4.1  /  TBili  0.6  /  DBili  x   /  AST  32  /  ALT  16  /  AlkPhos  70  03-      CARDIAC MARKERS ( 2023 19:18 )  x     / x     / 142 U/L / x     / x          Urinalysis Basic - ( 2023 19:18 )    Color: Light Yellow / Appearance: Clear / S.016 / pH: x  Gluc: x / Ketone: Negative  / Bili: Negative / Urobili: <2 mg/dL   Blood: x / Protein: Trace / Nitrite: Negative   Leuk Esterase: Large / RBC: 4 /HPF / WBC 15 /HPF   Sq Epi: x / Non Sq Epi: 11 /HPF / Bacteria: Negative        RADIOLOGY & ADDITIONAL TESTS:    Imaging Personally Reviewed:    Consultant(s) Notes Reviewed:      Care Discussed with Consultants/Other Providers:

## 2023-03-02 NOTE — PROGRESS NOTE ADULT - PROBLEM SELECTOR PLAN 9
DVT ppx - SCDs  Diet - Minced and moist, DASH/CC  Activity - OOB to chair/with assistance, PT eval    Fall and aspiration precautions DVT ppx - SCDs  Diet - Minced and moist, DASH/CC  Activity - OOB to chair/with assistance, PT eval    Fall and aspiration precautions  plan of care d/w pt and ACP

## 2023-03-02 NOTE — PROGRESS NOTE ADULT - PROBLEM SELECTOR PLAN 3
- New onset of an intensely pruritic maculopapular rash, noted to have significant excoriations on her back and arms  - Unclear on time frame of rash, unclear on causation of rash (possibly 2/2 tramadol as per son but unknown timing of rash)  - Patient's eosinophilia likely 2/2 rash, would trend for now  -Dermatology eval appreciated, recs noted

## 2023-03-02 NOTE — PROGRESS NOTE ADULT - PROBLEM SELECTOR PLAN 2
- c/w home keppra 500mg BID  - obtain routine EEG, seizure precautions  - f/u further neurology recommendations - c/w home keppra 500mg BID  - f/u VEEG, seizure precautions  - f/u further neurology recommendations

## 2023-03-03 ENCOUNTER — TRANSCRIPTION ENCOUNTER (OUTPATIENT)
Age: 88
End: 2023-03-03

## 2023-03-03 VITALS
DIASTOLIC BLOOD PRESSURE: 77 MMHG | SYSTOLIC BLOOD PRESSURE: 160 MMHG | HEART RATE: 82 BPM | OXYGEN SATURATION: 96 % | RESPIRATION RATE: 17 BRPM

## 2023-03-03 LAB
ANION GAP SERPL CALC-SCNC: 12 MMOL/L — SIGNIFICANT CHANGE UP (ref 7–14)
BUN SERPL-MCNC: 17 MG/DL — SIGNIFICANT CHANGE UP (ref 7–23)
CALCIUM SERPL-MCNC: 10.3 MG/DL — SIGNIFICANT CHANGE UP (ref 8.4–10.5)
CHLORIDE SERPL-SCNC: 102 MMOL/L — SIGNIFICANT CHANGE UP (ref 98–107)
CO2 SERPL-SCNC: 27 MMOL/L — SIGNIFICANT CHANGE UP (ref 22–31)
CREAT SERPL-MCNC: 0.95 MG/DL — SIGNIFICANT CHANGE UP (ref 0.5–1.3)
EGFR: 58 ML/MIN/1.73M2 — LOW
GLUCOSE BLDC GLUCOMTR-MCNC: 112 MG/DL — HIGH (ref 70–99)
GLUCOSE BLDC GLUCOMTR-MCNC: 115 MG/DL — HIGH (ref 70–99)
GLUCOSE BLDC GLUCOMTR-MCNC: 118 MG/DL — HIGH (ref 70–99)
GLUCOSE SERPL-MCNC: 100 MG/DL — HIGH (ref 70–99)
HCT VFR BLD CALC: 39.5 % — SIGNIFICANT CHANGE UP (ref 34.5–45)
HGB BLD-MCNC: 12.4 G/DL — SIGNIFICANT CHANGE UP (ref 11.5–15.5)
MAGNESIUM SERPL-MCNC: 1.8 MG/DL — SIGNIFICANT CHANGE UP (ref 1.6–2.6)
MCHC RBC-ENTMCNC: 29.2 PG — SIGNIFICANT CHANGE UP (ref 27–34)
MCHC RBC-ENTMCNC: 31.4 GM/DL — LOW (ref 32–36)
MCV RBC AUTO: 92.9 FL — SIGNIFICANT CHANGE UP (ref 80–100)
NRBC # BLD: 0 /100 WBCS — SIGNIFICANT CHANGE UP (ref 0–0)
NRBC # FLD: 0 K/UL — SIGNIFICANT CHANGE UP (ref 0–0)
PHOSPHATE SERPL-MCNC: 3.7 MG/DL — SIGNIFICANT CHANGE UP (ref 2.5–4.5)
PLATELET # BLD AUTO: 208 K/UL — SIGNIFICANT CHANGE UP (ref 150–400)
POTASSIUM SERPL-MCNC: 4.6 MMOL/L — SIGNIFICANT CHANGE UP (ref 3.5–5.3)
POTASSIUM SERPL-SCNC: 4.6 MMOL/L — SIGNIFICANT CHANGE UP (ref 3.5–5.3)
RBC # BLD: 4.25 M/UL — SIGNIFICANT CHANGE UP (ref 3.8–5.2)
RBC # FLD: 17.7 % — HIGH (ref 10.3–14.5)
SODIUM SERPL-SCNC: 141 MMOL/L — SIGNIFICANT CHANGE UP (ref 135–145)
WBC # BLD: 7.21 K/UL — SIGNIFICANT CHANGE UP (ref 3.8–10.5)
WBC # FLD AUTO: 7.21 K/UL — SIGNIFICANT CHANGE UP (ref 3.8–10.5)

## 2023-03-03 PROCEDURE — 99239 HOSP IP/OBS DSCHRG MGMT >30: CPT

## 2023-03-03 RX ORDER — LEVETIRACETAM 250 MG/1
1 TABLET, FILM COATED ORAL
Qty: 0 | Refills: 0 | DISCHARGE
Start: 2023-03-03

## 2023-03-03 RX ORDER — METOPROLOL TARTRATE 50 MG
1 TABLET ORAL
Qty: 0 | Refills: 0 | DISCHARGE
Start: 2023-03-03

## 2023-03-03 RX ORDER — CALAMINE AND ZINC OXIDE AND PHENOL 160; 10 MG/ML; MG/ML
1 LOTION TOPICAL
Qty: 0 | Refills: 0 | DISCHARGE
Start: 2023-03-03

## 2023-03-03 RX ORDER — LEVETIRACETAM 250 MG/1
1 TABLET, FILM COATED ORAL
Qty: 60 | Refills: 0
Start: 2023-03-03 | End: 2023-04-01

## 2023-03-03 RX ORDER — ACETAMINOPHEN 500 MG
2 TABLET ORAL
Qty: 0 | Refills: 0 | DISCHARGE
Start: 2023-03-03

## 2023-03-03 RX ORDER — HYDROCORTISONE 1 %
1 OINTMENT (GRAM) TOPICAL
Qty: 1 | Refills: 0
Start: 2023-03-03 | End: 2023-04-01

## 2023-03-03 RX ORDER — HYDROCORTISONE 1 %
1 OINTMENT (GRAM) TOPICAL
Qty: 0 | Refills: 0 | DISCHARGE
Start: 2023-03-03

## 2023-03-03 RX ORDER — ASPIRIN/CALCIUM CARB/MAGNESIUM 324 MG
1 TABLET ORAL
Qty: 0 | Refills: 0 | DISCHARGE
Start: 2023-03-03

## 2023-03-03 RX ORDER — LORATADINE 10 MG/1
1 TABLET ORAL
Qty: 0 | Refills: 0 | DISCHARGE
Start: 2023-03-03

## 2023-03-03 RX ORDER — METOPROLOL TARTRATE 50 MG
1 TABLET ORAL
Qty: 30 | Refills: 0
Start: 2023-03-03 | End: 2023-04-01

## 2023-03-03 RX ORDER — CALAMINE AND ZINC OXIDE AND PHENOL 160; 10 MG/ML; MG/ML
1 LOTION TOPICAL
Qty: 1 | Refills: 0
Start: 2023-03-03 | End: 2023-04-01

## 2023-03-03 RX ORDER — ASPIRIN/CALCIUM CARB/MAGNESIUM 324 MG
1 TABLET ORAL
Qty: 30 | Refills: 0
Start: 2023-03-03 | End: 2023-04-01

## 2023-03-03 RX ADMIN — Medication 1 APPLICATION(S): at 05:44

## 2023-03-03 RX ADMIN — Medication 1 APPLICATION(S): at 18:32

## 2023-03-03 RX ADMIN — CALAMINE AND ZINC OXIDE AND PHENOL 1 APPLICATION(S): 160; 10 LOTION TOPICAL at 05:46

## 2023-03-03 RX ADMIN — LEVETIRACETAM 500 MILLIGRAM(S): 250 TABLET, FILM COATED ORAL at 05:46

## 2023-03-03 RX ADMIN — Medication 81 MILLIGRAM(S): at 11:18

## 2023-03-03 RX ADMIN — LORATADINE 10 MILLIGRAM(S): 10 TABLET ORAL at 11:17

## 2023-03-03 RX ADMIN — BUDESONIDE AND FORMOTEROL FUMARATE DIHYDRATE 2 PUFF(S): 160; 4.5 AEROSOL RESPIRATORY (INHALATION) at 11:17

## 2023-03-03 RX ADMIN — LEVETIRACETAM 500 MILLIGRAM(S): 250 TABLET, FILM COATED ORAL at 18:32

## 2023-03-03 RX ADMIN — Medication 25 MILLIGRAM(S): at 05:46

## 2023-03-03 RX ADMIN — CALAMINE AND ZINC OXIDE AND PHENOL 1 APPLICATION(S): 160; 10 LOTION TOPICAL at 18:32

## 2023-03-03 RX ADMIN — MONTELUKAST 10 MILLIGRAM(S): 4 TABLET, CHEWABLE ORAL at 11:18

## 2023-03-03 RX ADMIN — CEFTRIAXONE 100 MILLIGRAM(S): 500 INJECTION, POWDER, FOR SOLUTION INTRAMUSCULAR; INTRAVENOUS at 14:00

## 2023-03-03 RX ADMIN — Medication 5 MILLIGRAM(S): at 05:45

## 2023-03-03 NOTE — PROGRESS NOTE ADULT - ASSESSMENT
This is an 87F with history as above who presents to the hospital with complaints of LOC.  87F with history as above who presents to the hospital with complaints of LOC.

## 2023-03-03 NOTE — DISCHARGE NOTE NURSING/CASE MANAGEMENT/SOCIAL WORK - NSDCPEFALRISK_GEN_ALL_CORE
For information on Fall & Injury Prevention, visit: [email or web address] OR  [email or web address] OR  https://www.cdc.gov/steadi/patient.html For information on Fall & Injury Prevention, visit: [email or web address] OR  [email or web address] OR  https://www.cdc.gov/steadi/patient.html For information on Fall & Injury Prevention, visit: [email or web address] OR  [email or web address] OR  https://www.cdc.gov/steadi/patient.html

## 2023-03-03 NOTE — DISCHARGE NOTE PROVIDER - NSDCMRMEDTOKEN_GEN_ALL_CORE_FT
acetaminophen 325 mg oral tablet: 2 tab(s) orally every 6 hours, As needed, Temp greater or equal to 38C (100.4F), Mild Pain (1 - 3), Moderate Pain (4 - 6)  ALENDRONATE 70MG TABLETS: TAKE 1 TABLET BY MOUTH ONCE A WEEK IN THE MORNING ON AN EMPTY STOMACH AND WITH PLENTY OF WATER  aspirin 81 mg oral delayed release tablet: 1 tab(s) orally once a day  calamine topical lotion: 1 application topically 2 times a day  clobetasol 0.05% topical ointment: 1 application topically 2 times a day  hydrocortisone 1% topical ointment: 1 application topically 2 times a day  JANUVIA 50MG TABLETS: Take 1 tablet orally once a day (Dec/2022 x 90 days).   LEFLUNOMIDE 20MG TABLETS: TAKE 1 TABLET BY MOUTH EVERY DAY   (Dispensed in [place]/2023 x 90 days).   levETIRAcetam 500 mg oral tablet: 1 tab(s) orally 2 times a day  loratadine 10 mg oral tablet: 1 tab(s) orally 2 times a day, As needed, itching  LOSARTAN 100MG TABLETS: TAKE 1 TABLET BY MOUTH EVERY DAY  (Last disp June/2022) ?   metoprolol succinate 25 mg oral tablet, extended release: 1 tab(s) orally once a day  MONTELUKAST 10MG TABLETS: TAKE 1 TABLET BY MOUTH EVERY DAY  PREDNISONE 5MG TABLETS: TAKE 1 TABLET BY MOUTH EVERY DAY  SYMBICORT 80/4.5MCG (120  ORAL INH): INHALE 2 PUFFS BY MOUTH EVERY DAY (Dispensed in Sept/2022 x 2 months).    acetaminophen 325 mg oral tablet: 2 tab(s) orally every 6 hours, As needed, Temp greater or equal to 38C (100.4F), Mild Pain (1 - 3), Moderate Pain (4 - 6)  ALENDRONATE 70MG TABLETS: TAKE 1 TABLET BY MOUTH ONCE A WEEK IN THE MORNING ON AN EMPTY STOMACH AND WITH PLENTY OF WATER  aspirin 81 mg oral delayed release tablet: 1 tab(s) orally once a day  calamine topical lotion: 1 application topically 2 times a day  clobetasol 0.05% topical ointment: 1 application topically 2 times a day  hydrocortisone 1% topical ointment: 1 application topically 2 times a day  JANUVIA 50MG TABLETS: Take 1 tablet orally once a day (Dec/2022 x 90 days).   LEFLUNOMIDE 20MG TABLETS: TAKE 1 TABLET BY MOUTH EVERY DAY   (Dispensed in [place]/2023 x 90 days).   levETIRAcetam 500 mg oral tablet: 1 tab(s) orally 2 times a day  loratadine 10 mg oral tablet: 1 tab(s) orally 2 times a day, As needed, itching  metoprolol succinate 25 mg oral tablet, extended release: 1 tab(s) orally once a day  MONTELUKAST 10MG TABLETS: TAKE 1 TABLET BY MOUTH EVERY DAY  PREDNISONE 5MG TABLETS: TAKE 1 TABLET BY MOUTH EVERY DAY  SYMBICORT 80/4.5MCG (120  ORAL INH): INHALE 2 PUFFS BY MOUTH EVERY DAY (Dispensed in Sept/2022 x 2 months).

## 2023-03-03 NOTE — PROGRESS NOTE ADULT - PROBLEM SELECTOR PLAN 2
- c/w home keppra 500mg BID  - f/u VEEG, seizure precautions  - f/u further neurology recommendations - c/w home keppra 500mg BID  - s/p VEEG, plan as above  -seizure precautions

## 2023-03-03 NOTE — PROGRESS NOTE ADULT - PROBLEM SELECTOR PLAN 4
- Reports having had dysuria and urinary frequency recently but unable to state when, denies any urinary complaints at present  - s/p ceftriaxone in ED, as per ED note was on keflex as outpatient  - Here UA with pyuria but also with some epithelial cells, unclear if patient still with UTI or if pyuria residual from treated UTI  - will give CTX for 2 more days to complete 3 day course

## 2023-03-03 NOTE — EEG REPORT - NS EEG TEXT BOX
[facility]   COMPREHENSIVE EPILEPSY CENTER   REPORT OF CONTINUOUS VIDEO EEG     [facility]: [address], [place] [address], Ph#: [phone]  [facility]: [address] [place] [address], Ph#: [phone]  [facility]: [address] [place] [address], Ph#: [phone]    Patient Name: SULAIMAN PELAEZ  Age and : 87y (37)  MRN #: [id number]  Location: [place] [address] A  Referring Physician: Russ Yap    Start Time/Date: 3/2/2023 0800AM  End Time/Date: 08:00 on 23  Duration: 24H    _____________________________________________________________  STUDY INFORMATION    EEG Recording Technique:  The patient underwent continuous Video-EEG monitoring, using Telemetry System hardware on the XLTek Digital System. EEG and video data were stored on a computer hard drive with important events saved in digital archive files. The material was reviewed by a physician (electroencephalographer / epileptologist) on a daily basis. Farshad and seizure detection algorithms were utilized and reviewed. An EEG Technician attended to the patient, and was available throughout daytime work hours.  The epilepsy center neurologist was available in person or on call 24-hours per day.    EEG Placement and Labeling of Electrodes:  The EEG was performed utilizing 20 channel referential EEG connections (coronal over temporal over parasagittal montage) using all standard 10-20 electrode placements with EKG, with additional electrodes placed in the inferior temporal region using the modified 10-10 montage electrode placements for elective admissions, or if deemed necessary. Recording was at a sampling rate of 256 samples per second per channel. Time synchronized digital video recording was done simultaneously with EEG recording. A low light infrared camera was used for low light recording.     _____________________________________________________________  HISTORY    Patient is a 87y old  Female who presents with a chief complaint of Syncope (01 Mar 2023 13:08)      PERTINENT MEDICATION:  MEDICATIONS  (STANDING):  aspirin enteric coated 81 milliGRAM(s) Oral daily  budesonide  80 MICROgram(s)/formoterol 4.5 MICROgram(s) Inhaler 2 Puff(s) Inhalation two times a day  calamine/zinc oxide Lotion 1 Application(s) Topical two times a day  clobetasol 0.05% Ointment 1 Application(s) Topical two times a day  dextrose 5%. 1000 milliLiter(s) (50 mL/Hr) IV Continuous <Continuous>  dextrose 5%. 1000 milliLiter(s) (100 mL/Hr) IV Continuous <Continuous>  dextrose 50% Injectable 25 Gram(s) IV Push once  dextrose 50% Injectable 12.5 Gram(s) IV Push once  dextrose 50% Injectable 25 Gram(s) IV Push once  glucagon  Injectable 1 milliGRAM(s) IntraMuscular once  hydrocortisone 1% Ointment 1 Application(s) Topical two times a day  influenza  Vaccine (HIGH DOSE) 0.7 milliLiter(s) IntraMuscular once  insulin lispro (ADMELOG) corrective regimen sliding scale   SubCutaneous three times a day before meals  insulin lispro (ADMELOG) corrective regimen sliding scale   SubCutaneous at bedtime  levETIRAcetam 500 milliGRAM(s) Oral two times a day  metoprolol succinate ER 25 milliGRAM(s) Oral daily  montelukast 10 milliGRAM(s) Oral daily  predniSONE   Tablet 5 milliGRAM(s) Oral daily    _____________________________________________________________  STUDY INTERPRETATION    Findings: The background was continuous, spontaneously variable and reactive. During wakefulness, the posterior dominant rhythm consisted of symmetric, 8Hz activity, with amplitude to 30 uV, that attenuated to eye opening.  Low amplitude frontal beta was noted in wakefulness.    Background Slowing:  Excess diffuse irregular theta and rare polymorphic delta slowing.    Focal Slowing:   None were present.    Sleep Background:  Drowsiness was characterized by fragmentation, attenuation, and slowing of the background activity.    Sleep was characterized by the presence of vertex waves, symmetric sleep spindles and K-complexes.    Other Non-Epileptiform Findings:  None were present.    Interictal Epileptiform Activity:   None were present.    Events:  Clinical events: None recorded.  Seizures: None recorded.    Activation Procedures:   Hyperventilation was not performed.    Photic stimulation was not performed.     Artifacts:  Intermittent myogenic and movement artifacts were noted.  _____________________________________________________________  EEG SUMMARY/CLASSIFICATION    Abnormal EEG in the awake, drowsy and asleep states.  - Mild generalized slowing.    _____________________________________________________________  EEG IMPRESSION/CLINICAL CORRELATE  Abnormal EEG study.  Mild nonspecific diffuse or multifocal cerebral dysfunction.   No epileptiform pattern or seizure seen.    **In absence of additional clinical concerns, recommend consideration for discontinuation of current EEG study with reconnection in future if warranted.    Solo Cheng MD  EEG/Epilepsy Attending  [facility]   COMPREHENSIVE EPILEPSY CENTER   REPORT OF CONTINUOUS VIDEO EEG     [facility]: [address], [place] [address], Ph#: [phone]  [facility] [address] [address], Ph#: [phone]  [facility]: [address] [place] [address], Ph#: [phone]    Patient Name: SULAIMAN PELAEZ  Age and : 87y (37)  MRN #: [id number]  Location: [place] [address] A  Referring Physician: Russ Yap    Start Time/Date: 3/2/2023 0800AM  End Time/Date: 08:00 on 23  Duration: 24H    _____________________________________________________________  STUDY INFORMATION    EEG Recording Technique:  The patient underwent continuous Video-EEG monitoring, using Telemetry System hardware on the XLTek Digital System. EEG and video data were stored on a computer hard drive with important events saved in digital archive files. The material was reviewed by a physician (electroencephalographer / epileptologist) on a daily basis. Farshad and seizure detection algorithms were utilized and reviewed. An EEG Technician attended to the patient, and was available throughout daytime work hours.  The epilepsy center neurologist was available in person or on call 24-hours per day.    EEG Placement and Labeling of Electrodes:  The EEG was performed utilizing 20 channel referential EEG connections (coronal over temporal over parasagittal montage) using all standard 10-20 electrode placements with EKG, with additional electrodes placed in the inferior temporal region using the modified 10-10 montage electrode placements for elective admissions, or if deemed necessary. Recording was at a sampling rate of 256 samples per second per channel. Time synchronized digital video recording was done simultaneously with EEG recording. A low light infrared camera was used for low light recording.     _____________________________________________________________  HISTORY    Patient is a 87y old  Female who presents with a chief complaint of Syncope (01 Mar 2023 13:08)      PERTINENT MEDICATION:  MEDICATIONS  (STANDING):  aspirin enteric coated 81 milliGRAM(s) Oral daily  budesonide  80 MICROgram(s)/formoterol 4.5 MICROgram(s) Inhaler 2 Puff(s) Inhalation two times a day  calamine/zinc oxide Lotion 1 Application(s) Topical two times a day  clobetasol 0.05% Ointment 1 Application(s) Topical two times a day  dextrose 5%. 1000 milliLiter(s) (50 mL/Hr) IV Continuous <Continuous>  dextrose 5%. 1000 milliLiter(s) (100 mL/Hr) IV Continuous <Continuous>  dextrose 50% Injectable 25 Gram(s) IV Push once  dextrose 50% Injectable 12.5 Gram(s) IV Push once  dextrose 50% Injectable 25 Gram(s) IV Push once  glucagon  Injectable 1 milliGRAM(s) IntraMuscular once  hydrocortisone 1% Ointment 1 Application(s) Topical two times a day  influenza  Vaccine (HIGH DOSE) 0.7 milliLiter(s) IntraMuscular once  insulin lispro (ADMELOG) corrective regimen sliding scale   SubCutaneous three times a day before meals  insulin lispro (ADMELOG) corrective regimen sliding scale   SubCutaneous at bedtime  levETIRAcetam 500 milliGRAM(s) Oral two times a day  metoprolol succinate ER 25 milliGRAM(s) Oral daily  montelukast 10 milliGRAM(s) Oral daily  predniSONE   Tablet 5 milliGRAM(s) Oral daily    _____________________________________________________________  STUDY INTERPRETATION    Findings: The background was continuous, spontaneously variable and reactive. During wakefulness, the posterior dominant rhythm consisted of symmetric, 8Hz activity, with amplitude to 30 uV, that attenuated to eye opening.  Low amplitude frontal beta was noted in wakefulness.    Background Slowing:  Excess diffuse irregular theta and rare polymorphic delta slowing.    Focal Slowing:   None were present.    Sleep Background:  Drowsiness was characterized by fragmentation, attenuation, and slowing of the background activity.    Sleep was characterized by the presence of vertex waves, symmetric sleep spindles and K-complexes.    Other Non-Epileptiform Findings:  None were present.    Interictal Epileptiform Activity:   None were present.    Events:  Clinical events: None recorded.  Seizures: None recorded.    Activation Procedures:   Hyperventilation was not performed.    Photic stimulation was not performed.     Artifacts:  Intermittent myogenic and movement artifacts were noted.  _____________________________________________________________  EEG SUMMARY/CLASSIFICATION    Abnormal EEG in the awake, drowsy and asleep states.  - Mild generalized slowing.    _____________________________________________________________  EEG IMPRESSION/CLINICAL CORRELATE  Abnormal EEG study.  Mild nonspecific diffuse or multifocal cerebral dysfunction.   No epileptiform pattern or seizure seen.    **In absence of additional clinical concerns, recommend consideration for discontinuation of current EEG study with reconnection in future if warranted.    Solo Cheng MD  EEG/Epilepsy Attending  [facility]   COMPREHENSIVE EPILEPSY CENTER   REPORT OF CONTINUOUS VIDEO EEG     [facility]: [address], [place] [address], Ph#: [phone]  [facility]: [address] [place] [address], Ph#: [phone]  [facility]: [address] [place] [address], Ph#: [phone]    Patient Name: SULAIMAN PELAEZ  Age and : 87y (37)  MRN #: [id number]  Location: [place] [address] A  Referring Physician: Russ Yap    Start Time/Date: 3/2/2023 0800AM  End Time/Date: 08:00 on 23  Duration: 24H    _____________________________________________________________  STUDY INFORMATION    EEG Recording Technique:  The patient underwent continuous Video-EEG monitoring, using Telemetry System hardware on the XLTek Digital System. EEG and video data were stored on a computer hard drive with important events saved in digital archive files. The material was reviewed by a physician (electroencephalographer / epileptologist) on a daily basis. Farshad and seizure detection algorithms were utilized and reviewed. An EEG Technician attended to the patient, and was available throughout daytime work hours.  The epilepsy center neurologist was available in person or on call 24-hours per day.    EEG Placement and Labeling of Electrodes:  The EEG was performed utilizing 20 channel referential EEG connections (coronal over temporal over parasagittal montage) using all standard 10-20 electrode placements with EKG, with additional electrodes placed in the inferior temporal region using the modified 10-10 montage electrode placements for elective admissions, or if deemed necessary. Recording was at a sampling rate of 256 samples per second per channel. Time synchronized digital video recording was done simultaneously with EEG recording. A low light infrared camera was used for low light recording.     _____________________________________________________________  HISTORY    Patient is a 87y old  Female who presents with a chief complaint of Syncope (01 Mar 2023 13:08)      PERTINENT MEDICATION:  MEDICATIONS  (STANDING):  aspirin enteric coated 81 milliGRAM(s) Oral daily  budesonide  80 MICROgram(s)/formoterol 4.5 MICROgram(s) Inhaler 2 Puff(s) Inhalation two times a day  calamine/zinc oxide Lotion 1 Application(s) Topical two times a day  clobetasol 0.05% Ointment 1 Application(s) Topical two times a day  dextrose 5%. 1000 milliLiter(s) (50 mL/Hr) IV Continuous <Continuous>  dextrose 5%. 1000 milliLiter(s) (100 mL/Hr) IV Continuous <Continuous>  dextrose 50% Injectable 25 Gram(s) IV Push once  dextrose 50% Injectable 12.5 Gram(s) IV Push once  dextrose 50% Injectable 25 Gram(s) IV Push once  glucagon  Injectable 1 milliGRAM(s) IntraMuscular once  hydrocortisone 1% Ointment 1 Application(s) Topical two times a day  influenza  Vaccine (HIGH DOSE) 0.7 milliLiter(s) IntraMuscular once  insulin lispro (ADMELOG) corrective regimen sliding scale   SubCutaneous three times a day before meals  insulin lispro (ADMELOG) corrective regimen sliding scale   SubCutaneous at bedtime  levETIRAcetam 500 milliGRAM(s) Oral two times a day  metoprolol succinate ER 25 milliGRAM(s) Oral daily  montelukast 10 milliGRAM(s) Oral daily  predniSONE   Tablet 5 milliGRAM(s) Oral daily    _____________________________________________________________  STUDY INTERPRETATION    Findings: The background was continuous, spontaneously variable and reactive. During wakefulness, the posterior dominant rhythm consisted of symmetric, 8Hz activity, with amplitude to 30 uV, that attenuated to eye opening.  Low amplitude frontal beta was noted in wakefulness.    Background Slowing:  Excess diffuse irregular theta and rare polymorphic delta slowing.    Focal Slowing:   None were present.    Sleep Background:  Drowsiness was characterized by fragmentation, attenuation, and slowing of the background activity.    Sleep was characterized by the presence of vertex waves, symmetric sleep spindles and K-complexes.    Other Non-Epileptiform Findings:  None were present.    Interictal Epileptiform Activity:   None were present.    Events:  Clinical events: None recorded.  Seizures: None recorded.    Activation Procedures:   Hyperventilation was not performed.    Photic stimulation was not performed.     Artifacts:  Intermittent myogenic and movement artifacts were noted.  _____________________________________________________________  EEG SUMMARY/CLASSIFICATION    Abnormal EEG in the awake, drowsy and asleep states.  - Mild generalized slowing.    _____________________________________________________________  EEG IMPRESSION/CLINICAL CORRELATE  Abnormal EEG study.  Mild nonspecific diffuse or multifocal cerebral dysfunction.   No epileptiform pattern or seizure seen.    **In absence of additional clinical concerns, recommend consideration for discontinuation of current EEG study with reconnection in future if warranted.    Solo Cheng MD  EEG/Epilepsy Attending

## 2023-03-03 NOTE — DISCHARGE NOTE PROVIDER - NSDCCPCAREPLAN_GEN_ALL_CORE_FT
PRINCIPAL DISCHARGE DIAGNOSIS  Diagnosis: Seizure-like activity  Assessment and Plan of Treatment: You presented to the hospital with loss of consciousness with unclear cuase but reported having palpitations prior to the episode. You were seen by the neurologist and was monitored on telemetry during your stay. EEG test was done which showed mild nonspecific diffuse or multifocal cerebral dysfunction. No epileptiform pattern or seizure seen. Please follow up with neurologist as outpatient for further management and continue medication as prescribed.  As you were seen for a fall. Maintain a safe environment. Do not change positions quickly. Particpate in program recommended by physical therapy, aspiration, and seizure precautions.  Seizure disorder.   - c/w home keppra 500mg BID  - s/p VEEG, plan as above  -seizure precautions.       PRINCIPAL DISCHARGE DIAGNOSIS  Diagnosis: Seizure-like activity  Assessment and Plan of Treatment: You presented to the hospital with loss of consciousness with unclear cuase but reported having palpitations prior to the episode. You were seen by the neurologist and was monitored on telemetry during your stay. EEG test was done which showed mild nonspecific diffuse or multifocal cerebral dysfunction. No epileptiform pattern or seizure seen. Please follow up with neurologist as outpatient for further management and continue medication as prescribed.  As you were seen for a fall. Maintain a safe environment. Do not change positions quickly. Particpate in program recommended by physical therapy, aspiration, and seizure precautions.  Seizure disorder.   - c/w home keppra 500mg BID  - s/p VEEG, plan as above  -seizure precautions.      SECONDARY DISCHARGE DIAGNOSES  Diagnosis: Maculopapular rash  Assessment and Plan of Treatment: you were seen by Dermatology. Please use the creams as directed. Please follow up with Dermatolgy as outpatient    Diagnosis: UTI (urinary tract infection)  Assessment and Plan of Treatment: you were treated with Antibiotiocs for urinary tract infection    Diagnosis: Type 2 diabetes mellitus  Assessment and Plan of Treatment: Continue consistent carbohydrate diet.  Monitor blood glucose levels throughout the day before meals and at bedtime.  Record blood sugars and bring to outpatient providers appointment in order to be reviewed by your doctor for management modifications.  Be aware of diabetes management symptoms including feeling cool and clammy, which may be related to low glucose levels.  Feeling hot and dry may indicate high glucose levels.  If  you feel these symptoms, check your blood sugar.  Make regular podiatry appointments in order to have feet checked for wounds and toe nails cut by a doctor to prevent infections.      Diagnosis: Essential hypertension  Assessment and Plan of Treatment: Continue blood pressure medication regimen as directed. Monitor for any visual changes, headaches or dizziness.  Monitor blood pressure regularly.  Follow up with your PCP for further management for high blood pressure.      Diagnosis: Rheumatoid arthritis  Assessment and Plan of Treatment: Please continue your medications as directed, Please follow uo with your Rheumatologist as outpatient    Diagnosis: Asthma  Assessment and Plan of Treatment: No evidence of exacerbation . Please continue your home medications . Please follow up with your PMD     PRINCIPAL DISCHARGE DIAGNOSIS  Diagnosis: Seizure-like activity  Assessment and Plan of Treatment: You presented to the hospital with loss of consciousness with unclear cuase but reported having palpitations prior to the episode. You were seen by the neurologist and was monitored on telemetry during your stay. EEG test was done which showed mild nonspecific diffuse or multifocal cerebral dysfunction. No epileptiform pattern or seizure seen. Please follow up with neurologist as outpatient for further management and continue medication as prescribed.  As you were seen for a fall. Maintain a safe environment. Do not change positions quickly. Particpate in program recommended by physical therapy, aspiration, and seizure precautions.  Please followup with epilepsy  team after discharge. Call [phone] to make an appointment. Also follow up with neurologist: Dr. Ghassan Allen ([address] [facility], Call [phone] to make appointment).      SECONDARY DISCHARGE DIAGNOSES  Diagnosis: Maculopapular rash  Assessment and Plan of Treatment: you were seen by Dermatology. Please use the creams as directed. Please follow up with Dermatolgy as outpatient. You recieved permetherin treatment on 3/1. Repeat permethrin 5% cream in 1 week (from head to toe, rinse off after 8-14 hours) ---> due 3/8    Diagnosis: UTI (urinary tract infection)  Assessment and Plan of Treatment: you were treated with Antibiotiocs for urinary tract infection    Diagnosis: Type 2 diabetes mellitus  Assessment and Plan of Treatment: Continue consistent carbohydrate diet.  Monitor blood glucose levels throughout the day before meals and at bedtime.  Record blood sugars and bring to outpatient providers appointment in order to be reviewed by your doctor for management modifications.  Be aware of diabetes management symptoms including feeling cool and clammy, which may be related to low glucose levels.  Feeling hot and dry may indicate high glucose levels.  If  you feel these symptoms, check your blood sugar.  Make regular podiatry appointments in order to have feet checked for wounds and toe nails cut by a doctor to prevent infections.      Diagnosis: Essential hypertension  Assessment and Plan of Treatment: Continue blood pressure medication regimen as directed. Monitor for any visual changes, headaches or dizziness.  Monitor blood pressure regularly.  Follow up with your PCP for further management for high blood pressure.      Diagnosis: Rheumatoid arthritis  Assessment and Plan of Treatment: Please continue your medications as directed, Please follow uo with your Rheumatologist as outpatient    Diagnosis: Asthma  Assessment and Plan of Treatment: No evidence of exacerbation . Please continue your home medications . Please follow up with your PMD     PRINCIPAL DISCHARGE DIAGNOSIS  Diagnosis: Seizure-like activity  Assessment and Plan of Treatment: You presented to the hospital with loss of consciousness with unclear cuase but reported having palpitations prior to the episode. You were seen by the neurologist and was monitored on telemetry during your stay. EEG test was done which showed mild nonspecific diffuse or multifocal cerebral dysfunction. No epileptiform pattern or seizure seen. Please follow up with neurologist as outpatient for further management and continue medication as prescribed.  As you were seen for a fall. Maintain a safe environment. Do not change positions quickly. Particpate in program recommended by physical therapy, aspiration, and seizure precautions.  Please followup with epilepsy  team after discharge. Call [phone] to make an appointment. Also follow up with neurologist: Dr. Ghassan Allen ([address] [facility], Call [phone] to make appointment).      SECONDARY DISCHARGE DIAGNOSES  Diagnosis: Maculopapular rash  Assessment and Plan of Treatment: you were seen by Dermatology. Please use the creams as directed. Please follow up with Dermatolgy as outpatient. You recieved permetherin treatment on 3/1. Repeat permethrin 5% cream in 1 week (from head to toe, rinse off after 8-14 hours) ---> due 3/8    Diagnosis: UTI (urinary tract infection)  Assessment and Plan of Treatment: you were treated with Antibiotiocs for urinary tract infection    Diagnosis: Type 2 diabetes mellitus  Assessment and Plan of Treatment: Continue consistent carbohydrate diet.  Monitor blood glucose levels throughout the day before meals and at bedtime.  Record blood sugars and bring to outpatient providers appointment in order to be reviewed by your doctor for management modifications.  Be aware of diabetes management symptoms including feeling cool and clammy, which may be related to low glucose levels.  Feeling hot and dry may indicate high glucose levels.  If  you feel these symptoms, check your blood sugar.  Make regular podiatry appointments in order to have feet checked for wounds and toe nails cut by a doctor to prevent infections.      Diagnosis: Essential hypertension  Assessment and Plan of Treatment: Continue blood pressure medication regimen as directed. Monitor for any visual changes, headaches or dizziness.  Monitor blood pressure regularly.  Follow up with your PCP for further management for high blood pressure.      Diagnosis: Rheumatoid arthritis  Assessment and Plan of Treatment: Please continue your medications as directed, Please follow uo with your Rheumatologist as outpatient    Diagnosis: Asthma  Assessment and Plan of Treatment: No evidence of exacerbation . Please continue your home medications . Please follow up with your PMD     PRINCIPAL DISCHARGE DIAGNOSIS  Diagnosis: Seizure-like activity  Assessment and Plan of Treatment: You presented to the hospital with loss of consciousness with unclear cuase but reported having palpitations prior to the episode. You were seen by the neurologist and was monitored on telemetry during your stay. EEG test was done which showed mild nonspecific diffuse or multifocal cerebral dysfunction. No epileptiform pattern or seizure seen. Please follow up with neurologist as outpatient for further management and continue medication as prescribed.  As you were seen for a fall. Maintain a safe environment. Do not change positions quickly. Particpate in program recommended by physical therapy, aspiration, and seizure precautions.  Please followup with epilepsy  team after discharge. Call [phone] to make an appointment. Also follow up with neurologist: Dr. Ghassan Allen ([address] [facility], Call [phone] to make appointment).      SECONDARY DISCHARGE DIAGNOSES  Diagnosis: Maculopapular rash  Assessment and Plan of Treatment: you were seen by Dermatology. Please use the creams as directed. Please follow up with Dermatolgy as outpatient. You recieved permetherin treatment on 3/1. Repeat permethrin 5% cream in 1 week (from head to toe, rinse off after 8-14 hours) ---> due 3/8    Diagnosis: UTI (urinary tract infection)  Assessment and Plan of Treatment: you were treated with Antibiotiocs for urinary tract infection    Diagnosis: Type 2 diabetes mellitus  Assessment and Plan of Treatment: Continue consistent carbohydrate diet.  Monitor blood glucose levels throughout the day before meals and at bedtime.  Record blood sugars and bring to outpatient providers appointment in order to be reviewed by your doctor for management modifications.  Be aware of diabetes management symptoms including feeling cool and clammy, which may be related to low glucose levels.  Feeling hot and dry may indicate high glucose levels.  If  you feel these symptoms, check your blood sugar.  Make regular podiatry appointments in order to have feet checked for wounds and toe nails cut by a doctor to prevent infections.      Diagnosis: Essential hypertension  Assessment and Plan of Treatment: Continue blood pressure medication regimen as directed. Monitor for any visual changes, headaches or dizziness.  Monitor blood pressure regularly.  Follow up with your PCP for further management for high blood pressure.      Diagnosis: Rheumatoid arthritis  Assessment and Plan of Treatment: Please continue your medications as directed, Please follow uo with your Rheumatologist as outpatient    Diagnosis: Asthma  Assessment and Plan of Treatment: No evidence of exacerbation . Please continue your home medications . Please follow up with your PMD

## 2023-03-03 NOTE — DISCHARGE NOTE PROVIDER - NSDCFUADDAPPT_GEN_ALL_CORE_FT
Please followup with epilepsy  team after discharge. Call [phone] to make an appointment. Also follow up with neurologist: Dr. Ghassan Allen ([address] [facility], Call [phone] to make appointment). Please followup with epilepsy  team after discharge. Call [phone] to make an appointment. Also follow up with neurologist: Dr. Ghassan Allen ([address] [facility], Call [phone] to make appointment). Please followup with epilepsy  team after discharge. Call [phone] to make an appointment. Also follow up with neurologist: Dr. Ghassan Allen ([address] [facility], Call [phone] to make appointment).

## 2023-03-03 NOTE — PROGRESS NOTE ADULT - PROBLEM SELECTOR PLAN 6
- Pt on Losartan 100 mg and Toprol XL 25 mg   - not sure if patient compliant with medications,  toprol  resumed , BP within goal

## 2023-03-03 NOTE — PROGRESS NOTE ADULT - PROBLEM SELECTOR PLAN 9
DVT ppx - SCDs  Diet - Minced and moist, DASH/CC  Activity - OOB to chair/with assistance, PT eval    Fall and aspiration precautions  plan of care d/w pt and ACP DVT ppx - SCDs  Diet - Minced and moist, DASH/CC  Activity - OOB to chair/with assistance,   PT eval-TRINI     Fall and aspiration precautions  plan of care d/w pt with Serbian translation   case d/w ACP DVT ppx - SCDs  Diet - Minced and moist, DASH/CC  Activity - OOB to chair/with assistance,   PT eval-TRINI     Fall and aspiration precautions  plan of care d/w pt with Icelandic translation   case d/w ACP DVT ppx - SCDs  Diet - Minced and moist, DASH/CC  Activity - OOB to chair/with assistance,   PT eval-TRINI     Fall and aspiration precautions  plan of care d/w pt with Kazakh translation   case d/w ACP DVT ppx - SCDs  Diet - Minced and moist, DASH/CC  Activity - OOB to chair/with assistance,   PT eval-TRINI     Fall and aspiration precautions  plan of care d/w pt with Macedonian translation   plan of care d/w son on the phone   case d/w ACP  Patient hemodynamically stable for discharge   Time spent in discharge process is 35 min DVT ppx - SCDs  Diet - Minced and moist, DASH/CC  Activity - OOB to chair/with assistance,   PT eval-TRINI     Fall and aspiration precautions  plan of care d/w pt with Yi translation   plan of care d/w son on the phone   case d/w ACP  Patient hemodynamically stable for discharge   Time spent in discharge process is 35 min DVT ppx - SCDs  Diet - Minced and moist, DASH/CC  Activity - OOB to chair/with assistance,   PT eval-TRINI     Fall and aspiration precautions  plan of care d/w pt with Icelandic translation   plan of care d/w son on the phone   case d/w ACP  Patient hemodynamically stable for discharge   Time spent in discharge process is 35 min

## 2023-03-03 NOTE — PROGRESS NOTE ADULT - SUBJECTIVE AND OBJECTIVE BOX
Attending Porsche Coffey: Gen: NAD, heent: atrauamtic, eomi, perrla, mmm, op pink, uvula midline, neck; nttp, no nuchal rigidity, chest: nttp, no crepitus, cv:reguklar, tachycardic, no murmurs, lungs: ctab, abd: soft, nontender, nondistended, no peritoneal signs, +BS, no guarding, ext: wwp, neg homans, skin: no rash, neuro: awake and alert, following commands, speech clear, sensation and strength intact, no focal deficits Attending Porsche Coffey: Gen: NAD, heent: atrauamtic, eomi, perrla, mmm, op pink, uvula midline, neck; nttp, no nuchal rigidity, chest: nttp, no crepitus, cv:reguklar, tachycardic, no murmurs, lungs: ctab, abd: soft, nontender, nondistended, no peritoneal signs, +BS, no guarding, ext: wwp, neg homans, skin: no rash, right groin with ecchymoses, no hematoma, no bruit neuro: awake and alert, following commands, speech clear, sensation and strength intact, no focal deficits Patient is a 87y old  Female who presents with a chief complaint of Syncope (02 Mar 2023 09:33)      SUBJECTIVE / OVERNIGHT EVENTS:    MEDICATIONS  (STANDING):  aspirin enteric coated 81 milliGRAM(s) Oral daily  budesonide  80 MICROgram(s)/formoterol 4.5 MICROgram(s) Inhaler 2 Puff(s) Inhalation two times a day  calamine/zinc oxide Lotion 1 Application(s) Topical two times a day  cefTRIAXone   IVPB 1000 milliGRAM(s) IV Intermittent every 24 hours  clobetasol 0.05% Ointment 1 Application(s) Topical two times a day  dextrose 5%. 1000 milliLiter(s) (50 mL/Hr) IV Continuous <Continuous>  dextrose 5%. 1000 milliLiter(s) (100 mL/Hr) IV Continuous <Continuous>  dextrose 50% Injectable 25 Gram(s) IV Push once  dextrose 50% Injectable 12.5 Gram(s) IV Push once  dextrose 50% Injectable 25 Gram(s) IV Push once  glucagon  Injectable 1 milliGRAM(s) IntraMuscular once  hydrocortisone 1% Ointment 1 Application(s) Topical two times a day  influenza  Vaccine (HIGH DOSE) 0.7 milliLiter(s) IntraMuscular once  insulin lispro (ADMELOG) corrective regimen sliding scale   SubCutaneous three times a day before meals  insulin lispro (ADMELOG) corrective regimen sliding scale   SubCutaneous at bedtime  levETIRAcetam 500 milliGRAM(s) Oral two times a day  metoprolol succinate ER 25 milliGRAM(s) Oral daily  montelukast 10 milliGRAM(s) Oral daily  predniSONE   Tablet 5 milliGRAM(s) Oral daily    MEDICATIONS  (PRN):  acetaminophen     Tablet .. 650 milliGRAM(s) Oral every 6 hours PRN Temp greater or equal to 38C (100.4F), Mild Pain (1 - 3), Moderate Pain (4 - 6)  aluminum hydroxide/magnesium hydroxide/simethicone Suspension 30 milliLiter(s) Oral every 4 hours PRN Dyspepsia  dextrose Oral Gel 15 Gram(s) Oral once PRN Blood Glucose LESS THAN 70 milliGRAM(s)/deciliter  loratadine 10 milliGRAM(s) Oral two times a day PRN itching  melatonin 3 milliGRAM(s) Oral at bedtime PRN Insomnia  ondansetron Injectable 4 milliGRAM(s) IV Push every 8 hours PRN Nausea and/or Vomiting      Vital Signs Last 24 Hrs  T(C): 36.6 (03 Mar 2023 05:50), Max: 36.9 (02 Mar 2023 12:22)  T(F): 97.9 (03 Mar 2023 05:50), Max: 98.5 (02 Mar 2023 12:22)  HR: 74 (03 Mar 2023 05:50) (74 - 82)  BP: 144/70 (03 Mar 2023 05:50) (144/70 - 147/65)  BP(mean): --  RR: 17 (03 Mar 2023 05:50) (16 - 17)  SpO2: 100% (03 Mar 2023 05:50) (100% - 100%)    Parameters below as of 03 Mar 2023 05:50  Patient On (Oxygen Delivery Method): room air      CAPILLARY BLOOD GLUCOSE      POCT Blood Glucose.: 116 mg/dL (02 Mar 2023 21:50)  POCT Blood Glucose.: 104 mg/dL (02 Mar 2023 17:42)  POCT Blood Glucose.: 97 mg/dL (02 Mar 2023 12:14)  POCT Blood Glucose.: 114 mg/dL (02 Mar 2023 08:43)    I&O's Summary    01 Mar 2023 07:01  -  02 Mar 2023 07:00  --------------------------------------------------------  IN: 300 mL / OUT: 0 mL / NET: 300 mL    02 Mar 2023 07:01  -  03 Mar 2023 06:56  --------------------------------------------------------  IN: 0 mL / OUT: 800 mL / NET: -800 mL        PHYSICAL EXAM:  GENERAL: NAD, well-developed  HEAD:  Atraumatic, Normocephalic  EYES: EOMI, PERRLA, conjunctiva and sclera clear  NECK: Supple, No JVD  CHEST/LUNG: Clear to auscultation bilaterally; No wheeze  HEART: Regular rate and rhythm; No murmurs, rubs, or gallops  ABDOMEN: Soft, Nontender, Nondistended; Bowel sounds present  EXTREMITIES:  2+ Peripheral Pulses, No clubbing, cyanosis, or edema  PSYCH: AAOx3  NEUROLOGY: non-focal  SKIN: No rashes or lesions    LABS:                    RADIOLOGY & ADDITIONAL TESTS:    Imaging Personally Reviewed:    Consultant(s) Notes Reviewed:      Care Discussed with Consultants/Other Providers:   Patient is a 87y old  Female who presents with a chief complaint of Syncope (02 Mar 2023 09:33)      SUBJECTIVE / OVERNIGHT EVENTS: patient seen and examined by bedside, pt c/o mild headache, itching improved after she was cleaned .Pt  deniesdizziness, SOB, CP, Palpitations , N/V/D, abdominal pain  history obtained with translation (pacific  ID#[id number])         TELE: First degree AV block     MEDICATIONS  (STANDING):  aspirin enteric coated 81 milliGRAM(s) Oral daily  budesonide  80 MICROgram(s)/formoterol 4.5 MICROgram(s) Inhaler 2 Puff(s) Inhalation two times a day  calamine/zinc oxide Lotion 1 Application(s) Topical two times a day  cefTRIAXone   IVPB 1000 milliGRAM(s) IV Intermittent every 24 hours  clobetasol 0.05% Ointment 1 Application(s) Topical two times a day  dextrose 5%. 1000 milliLiter(s) (50 mL/Hr) IV Continuous <Continuous>  dextrose 5%. 1000 milliLiter(s) (100 mL/Hr) IV Continuous <Continuous>  dextrose 50% Injectable 25 Gram(s) IV Push once  dextrose 50% Injectable 12.5 Gram(s) IV Push once  dextrose 50% Injectable 25 Gram(s) IV Push once  glucagon  Injectable 1 milliGRAM(s) IntraMuscular once  hydrocortisone 1% Ointment 1 Application(s) Topical two times a day  influenza  Vaccine (HIGH DOSE) 0.7 milliLiter(s) IntraMuscular once  insulin lispro (ADMELOG) corrective regimen sliding scale   SubCutaneous three times a day before meals  insulin lispro (ADMELOG) corrective regimen sliding scale   SubCutaneous at bedtime  levETIRAcetam 500 milliGRAM(s) Oral two times a day  metoprolol succinate ER 25 milliGRAM(s) Oral daily  montelukast 10 milliGRAM(s) Oral daily  predniSONE   Tablet 5 milliGRAM(s) Oral daily    MEDICATIONS  (PRN):  acetaminophen     Tablet .. 650 milliGRAM(s) Oral every 6 hours PRN Temp greater or equal to 38C (100.4F), Mild Pain (1 - 3), Moderate Pain (4 - 6)  aluminum hydroxide/magnesium hydroxide/simethicone Suspension 30 milliLiter(s) Oral every 4 hours PRN Dyspepsia  dextrose Oral Gel 15 Gram(s) Oral once PRN Blood Glucose LESS THAN 70 milliGRAM(s)/deciliter  loratadine 10 milliGRAM(s) Oral two times a day PRN itching  melatonin 3 milliGRAM(s) Oral at bedtime PRN Insomnia  ondansetron Injectable 4 milliGRAM(s) IV Push every 8 hours PRN Nausea and/or Vomiting      Vital Signs Last 24 Hrs  T(C): 36.6 (03 Mar 2023 05:50), Max: 36.9 (02 Mar 2023 12:22)  T(F): 97.9 (03 Mar 2023 05:50), Max: 98.5 (02 Mar 2023 12:22)  HR: 74 (03 Mar 2023 05:50) (74 - 82)  BP: 144/70 (03 Mar 2023 05:50) (144/70 - 147/65)  BP(mean): --  RR: 17 (03 Mar 2023 05:50) (16 - 17)  SpO2: 100% (03 Mar 2023 05:50) (100% - 100%)    Parameters below as of 03 Mar 2023 05:50  Patient On (Oxygen Delivery Method): room air      CAPILLARY BLOOD GLUCOSE      POCT Blood Glucose.: 116 mg/dL (02 Mar 2023 21:50)  POCT Blood Glucose.: 104 mg/dL (02 Mar 2023 17:42)  POCT Blood Glucose.: 97 mg/dL (02 Mar 2023 12:14)  POCT Blood Glucose.: 114 mg/dL (02 Mar 2023 08:43)    I&O's Summary    01 Mar 2023 07:01  -  02 Mar 2023 07:00  --------------------------------------------------------  IN: 300 mL / OUT: 0 mL / NET: 300 mL    02 Mar 2023 07:01  -  03 Mar 2023 06:56  --------------------------------------------------------  IN: 0 mL / OUT: 800 mL / NET: -800 mL    PHYSICAL EXAM:  GENERAL: NAD, well-developed,   HEAD:  Atraumatic, Normocephalic, VEEG porsche in place   EYES: EOMI, PERRLA, conjunctiva and sclera clear  NECK: Supple, No JVD  CHEST/LUNG: Clear to auscultation bilaterally; No wheeze  HEART: Regular rate and rhythm; No murmurs, rubs, or gallops  ABDOMEN: Soft, Nontender, Nondistended; Bowel sounds present  EXTREMITIES:  2+ Peripheral Pulses, No clubbing, cyanosis, or edema  PSYCH: AAOx3, aware of being in hospital , does not know the name, but able to state month, date and day correctly   NEUROLOGY: non-focal  SKIN: +Maculopapular rash on b/l arms and back with multiple areas of excoriations ,           LABS:            no new labs         RADIOLOGY & ADDITIONAL TESTS:    Abnormal EEG in the awake, drowsy and asleep states.  - Mild generalized slowing.    _____________________________________________________________  EEG IMPRESSION/CLINICAL CORRELATE  Abnormal EEG study.  Mild nonspecific diffuse or multifocal cerebral dysfunction.   No epileptiform pattern or seizure seen.      Consultant(s) Notes Reviewed:  Neurology     Care Discussed with Consultants/Other Providers: Neurology    Patient is a 87y old  Female who presents with a chief complaint of Syncope (02 Mar 2023 09:33)      SUBJECTIVE / OVERNIGHT EVENTS: patient seen and examined by bedside, pt c/o mild headache, itching improved after she was cleaned .Pt  deniesdizziness, SOB, CP, Palpitations , N/V/D, abdominal pain  history obtained with translation (pacific  ID#[id number])         TELE: First degree AV block     MEDICATIONS  (STANDING):  aspirin enteric coated 81 milliGRAM(s) Oral daily  budesonide  80 MICROgram(s)/formoterol 4.5 MICROgram(s) Inhaler 2 Puff(s) Inhalation two times a day  calamine/zinc oxide Lotion 1 Application(s) Topical two times a day  cefTRIAXone   IVPB 1000 milliGRAM(s) IV Intermittent every 24 hours  clobetasol 0.05% Ointment 1 Application(s) Topical two times a day  dextrose 5%. 1000 milliLiter(s) (50 mL/Hr) IV Continuous <Continuous>  dextrose 5%. 1000 milliLiter(s) (100 mL/Hr) IV Continuous <Continuous>  dextrose 50% Injectable 25 Gram(s) IV Push once  dextrose 50% Injectable 12.5 Gram(s) IV Push once  dextrose 50% Injectable 25 Gram(s) IV Push once  glucagon  Injectable 1 milliGRAM(s) IntraMuscular once  hydrocortisone 1% Ointment 1 Application(s) Topical two times a day  influenza  Vaccine (HIGH DOSE) 0.7 milliLiter(s) IntraMuscular once  insulin lispro (ADMELOG) corrective regimen sliding scale   SubCutaneous three times a day before meals  insulin lispro (ADMELOG) corrective regimen sliding scale   SubCutaneous at bedtime  levETIRAcetam 500 milliGRAM(s) Oral two times a day  metoprolol succinate ER 25 milliGRAM(s) Oral daily  montelukast 10 milliGRAM(s) Oral daily  predniSONE   Tablet 5 milliGRAM(s) Oral daily    MEDICATIONS  (PRN):  acetaminophen     Tablet .. 650 milliGRAM(s) Oral every 6 hours PRN Temp greater or equal to 38C (100.4F), Mild Pain (1 - 3), Moderate Pain (4 - 6)  aluminum hydroxide/magnesium hydroxide/simethicone Suspension 30 milliLiter(s) Oral every 4 hours PRN Dyspepsia  dextrose Oral Gel 15 Gram(s) Oral once PRN Blood Glucose LESS THAN 70 milliGRAM(s)/deciliter  loratadine 10 milliGRAM(s) Oral two times a day PRN itching  melatonin 3 milliGRAM(s) Oral at bedtime PRN Insomnia  ondansetron Injectable 4 milliGRAM(s) IV Push every 8 hours PRN Nausea and/or Vomiting      Vital Signs Last 24 Hrs  T(C): 36.6 (03 Mar 2023 05:50), Max: 36.9 (02 Mar 2023 12:22)  T(F): 97.9 (03 Mar 2023 05:50), Max: 98.5 (02 Mar 2023 12:22)  HR: 74 (03 Mar 2023 05:50) (74 - 82)  BP: 144/70 (03 Mar 2023 05:50) (144/70 - 147/65)  BP(mean): --  RR: 17 (03 Mar 2023 05:50) (16 - 17)  SpO2: 100% (03 Mar 2023 05:50) (100% - 100%)    Parameters below as of 03 Mar 2023 05:50  Patient On (Oxygen Delivery Method): room air      CAPILLARY BLOOD GLUCOSE      POCT Blood Glucose.: 116 mg/dL (02 Mar 2023 21:50)  POCT Blood Glucose.: 104 mg/dL (02 Mar 2023 17:42)  POCT Blood Glucose.: 97 mg/dL (02 Mar 2023 12:14)  POCT Blood Glucose.: 114 mg/dL (02 Mar 2023 08:43)    I&O's Summary    01 Mar 2023 07:01  -  02 Mar 2023 07:00  --------------------------------------------------------  IN: 300 mL / OUT: 0 mL / NET: 300 mL    02 Mar 2023 07:01  -  03 Mar 2023 06:56  --------------------------------------------------------  IN: 0 mL / OUT: 800 mL / NET: -800 mL    PHYSICAL EXAM:  GENERAL: NAD, well-developed,   HEAD:  Atraumatic, Normocephalic, VEEG porsche in place   EYES: EOMI, PERRLA, conjunctiva and sclera clear  NECK: Supple, No JVD  CHEST/LUNG: Clear to auscultation bilaterally; No wheeze  HEART: Regular rate and rhythm; No murmurs, rubs, or gallops  ABDOMEN: Soft, Nontender, Nondistended; Bowel sounds present  EXTREMITIES:  2+ Peripheral Pulses, No clubbing, cyanosis, or edema  PSYCH: AAOx3, aware of being in hospital , does not know the name, but able to state month, date and day correctly   NEUROLOGY: non-focal  SKIN: +Maculopapular rash on b/l arms and back with multiple areas of excoriations ,           LABS:            no new labs         RADIOLOGY & ADDITIONAL TESTS:    Abnormal EEG in the awake, drowsy and asleep states.  - Mild generalized slowing.    _____________________________________________________________  EEG IMPRESSION/CLINICAL CORRELATE  Abnormal EEG study.  Mild nonspecific diffuse or multifocal cerebral dysfunction.   No epileptiform pattern or seizure seen.      Consultant(s) Notes Reviewed:  Neurology     Care Discussed with Consultants/Other Providers: Neurology    Patient is a 87y old  Female who presents with a chief complaint of Syncope (02 Mar 2023 09:33)      SUBJECTIVE / OVERNIGHT EVENTS: patient seen and examined by bedside, pt c/o mild headache, itching improved after she was cleaned .Pt  deniesdizziness, SOB, CP, Palpitations , N/V/D, abdominal pain  history obtained with translation (pacific  ID#[id number])         TELE: First degree AV block     MEDICATIONS  (STANDING):  aspirin enteric coated 81 milliGRAM(s) Oral daily  budesonide  80 MICROgram(s)/formoterol 4.5 MICROgram(s) Inhaler 2 Puff(s) Inhalation two times a day  calamine/zinc oxide Lotion 1 Application(s) Topical two times a day  cefTRIAXone   IVPB 1000 milliGRAM(s) IV Intermittent every 24 hours  clobetasol 0.05% Ointment 1 Application(s) Topical two times a day  dextrose 5%. 1000 milliLiter(s) (50 mL/Hr) IV Continuous <Continuous>  dextrose 5%. 1000 milliLiter(s) (100 mL/Hr) IV Continuous <Continuous>  dextrose 50% Injectable 25 Gram(s) IV Push once  dextrose 50% Injectable 12.5 Gram(s) IV Push once  dextrose 50% Injectable 25 Gram(s) IV Push once  glucagon  Injectable 1 milliGRAM(s) IntraMuscular once  hydrocortisone 1% Ointment 1 Application(s) Topical two times a day  influenza  Vaccine (HIGH DOSE) 0.7 milliLiter(s) IntraMuscular once  insulin lispro (ADMELOG) corrective regimen sliding scale   SubCutaneous three times a day before meals  insulin lispro (ADMELOG) corrective regimen sliding scale   SubCutaneous at bedtime  levETIRAcetam 500 milliGRAM(s) Oral two times a day  metoprolol succinate ER 25 milliGRAM(s) Oral daily  montelukast 10 milliGRAM(s) Oral daily  predniSONE   Tablet 5 milliGRAM(s) Oral daily    MEDICATIONS  (PRN):  acetaminophen     Tablet .. 650 milliGRAM(s) Oral every 6 hours PRN Temp greater or equal to 38C (100.4F), Mild Pain (1 - 3), Moderate Pain (4 - 6)  aluminum hydroxide/magnesium hydroxide/simethicone Suspension 30 milliLiter(s) Oral every 4 hours PRN Dyspepsia  dextrose Oral Gel 15 Gram(s) Oral once PRN Blood Glucose LESS THAN 70 milliGRAM(s)/deciliter  loratadine 10 milliGRAM(s) Oral two times a day PRN itching  melatonin 3 milliGRAM(s) Oral at bedtime PRN Insomnia  ondansetron Injectable 4 milliGRAM(s) IV Push every 8 hours PRN Nausea and/or Vomiting      Vital Signs Last 24 Hrs  T(C): 36.6 (03 Mar 2023 05:50), Max: 36.9 (02 Mar 2023 12:22)  T(F): 97.9 (03 Mar 2023 05:50), Max: 98.5 (02 Mar 2023 12:22)  HR: 74 (03 Mar 2023 05:50) (74 - 82)  BP: 144/70 (03 Mar 2023 05:50) (144/70 - 147/65)  BP(mean): --  RR: 17 (03 Mar 2023 05:50) (16 - 17)  SpO2: 100% (03 Mar 2023 05:50) (100% - 100%)    Parameters below as of 03 Mar 2023 05:50  Patient On (Oxygen Delivery Method): room air      CAPILLARY BLOOD GLUCOSE      POCT Blood Glucose.: 116 mg/dL (02 Mar 2023 21:50)  POCT Blood Glucose.: 104 mg/dL (02 Mar 2023 17:42)  POCT Blood Glucose.: 97 mg/dL (02 Mar 2023 12:14)  POCT Blood Glucose.: 114 mg/dL (02 Mar 2023 08:43)    I&O's Summary    01 Mar 2023 07:01  -  02 Mar 2023 07:00  --------------------------------------------------------  IN: 300 mL / OUT: 0 mL / NET: 300 mL    02 Mar 2023 07:01  -  03 Mar 2023 06:56  --------------------------------------------------------  IN: 0 mL / OUT: 800 mL / NET: -800 mL    PHYSICAL EXAM:  GENERAL: NAD, well-developed,   HEAD:  Atraumatic, Normocephalic, VEEG porsche in place   EYES: EOMI, PERRLA, conjunctiva and sclera clear  NECK: Supple, No JVD  CHEST/LUNG: Clear to auscultation bilaterally; No wheeze  HEART: Regular rate and rhythm; No murmurs, rubs, or gallops  ABDOMEN: Soft, Nontender, Nondistended; Bowel sounds present  EXTREMITIES:  2+ Peripheral Pulses, No clubbing, cyanosis, or edema  PSYCH: AAOx3, aware of being in hospital , does not know the name, but able to state month, date and day correctly   NEUROLOGY: non-focal  SKIN: +Maculopapular rash on b/l arms and back with multiple areas of excoriations ,           LABS:            no new labs         RADIOLOGY & ADDITIONAL TESTS:    Abnormal EEG in the awake, drowsy and asleep states.  - Mild generalized slowing.    _____________________________________________________________  EEG IMPRESSION/CLINICAL CORRELATE  Abnormal EEG study.  Mild nonspecific diffuse or multifocal cerebral dysfunction.   No epileptiform pattern or seizure seen.      Consultant(s) Notes Reviewed:  Neurology     Care Discussed with Consultants/Other Providers: Neurology

## 2023-03-03 NOTE — DISCHARGE NOTE NURSING/CASE MANAGEMENT/SOCIAL WORK - PATIENT PORTAL LINK FT
You can access the FollowMyHealth Patient Portal offered by [facility] by registering at the following website: [email or web address]. By joining [employer]’s FollowMyHealth portal, you will also be able to view your health information using other applications (apps) compatible with our system. You can access the FollowMyHealth Patient Portal offered by [facility] by registering at the following website: [email or web address]. By joining [employer]’s FollowMyHealth portal, you will also be able to view your health information using other applications (apps) compatible with our system. You can access the FollowMyHealth Patient Portal offered by [facility] by registering at the following website: [email or web address]. By joining [employer]’s FollowMyHealth portal, you will also be able to view your health information using other applications (apps) compatible with our system.

## 2023-03-03 NOTE — CHART NOTE - NSCHARTNOTEFT_GEN_A_CORE
Pt and family originally agreed on rehab and SW was able to set up transportation for patient 4:30pm. Notified by nurse at 4pm that patient is refusing to go rehab at this time since she is worried about taking care her other son who is mentally ill. Provider, RN, family members (daughter and son) explained in length the importance of her recovery in order for her to take care of herself and her son. However, pt was very adamant about leaving today to go home. Wesley (son) presented to the bedside, risks of taking patient home without home care/home PT/rehab was fully explained multiple times to Wesley and patient herself. Wesley is willing to take full responsibility of the danger and risks of taking mother home. Case was discussed with ED case manager who will try to send home care referral once they are open again, but emphasized that no home care can be setup or confirmed today. Wesley fully understands the situation and is willing to take her home today. Case was discussed with Dr. Yap. Pt and family originally agreed on rehab and SW was able to set up transportation for patient 4:30pm. Notified by nurse at 4pm that patient is refusing to go rehab at this time since she is worried about taking care her other son who is mentally ill. Provider, RN, family members (daughter and son) explained in length the importance of her recovery in order for her to take care of herself and her son. However, pt was very adamant about leaving today to go home. Wesley (son) presented to the bedside, risks of taking patient home without home care/home PT/rehab was fully explained multiple times to Wesley and patient herself. Wesley is willing to take full responsibility of the danger and risks of taking mother home. Case was discussed with ED case manager who will try to send home care referral once they are open again, but emphasized that no home care can be setup or confirmed today. Wesley fully understands the situation and is willing to take her home today. Case was discussed with Dr. Yap.    Update:  - 6:30pm- pt is agreeable to go to rehab again as she knows rehab is most beneficial for now.

## 2023-03-03 NOTE — DISCHARGE NOTE PROVIDER - NSDCHHCONTACT_GEN_ALL_CORE_FT
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 -Continue to monitor for now. Pt will notify us if any new symptoms develop.-The patient was informed of the symptoms related to progression of the opacification.-Discussed with patient she would not notice a significant improvement in [place] with having laser procedure done at this time. Reocommend waiting at this time.  s/p PCIOL-Stable PCIOL OU.MARILYN OU-Recommend using AT's OU QID -Discussed with patient to help with [facility] recommending using AT's more frequently and constant blinking. -Sample of Systane Complete given to patient.; Dr's Notes: PCP Saul Fritz MD in Dr. Akshat Boyd officeDFE 4/10/19 As certified below, I, or a nurse practitioner or physician assistant working with me, had a face-to-face encounter that meets the physician face-to-face encounter requirements.

## 2023-03-03 NOTE — PROGRESS NOTE ADULT - PROBLEM SELECTOR PLAN 1
- Patient with a few episodes of LOC but unclear on cause, did report having palpitations prior to her episodes so higher suspicion for cardiac cause but as per ED note patient's son reported her having seizures  - Neurology evaluation appreciated   -VEEG in progress   - Check orthostatics  - Monitor on telemetry  - Monitor for further episodes of LOC  - Fall, aspiration, and seizure precautions - Patient with a few episodes of LOC but unclear on cause, did report having palpitations prior to her episodes so higher suspicion for cardiac cause but as per ED note patient's son reported her having seizures  - Neurology evaluation appreciated   -VEEG noted as above , Mild nonspecific diffuse or multifocal cerebral dysfunction. No epileptiform pattern or seizure seen. Neurology f/u appreciated, no further inpatient w/u , c/w Keppra , f/u Neurology as outpt   - Check orthostatics  - Monitor on telemetry  - Monitor for further episodes of LOC  - Fall, aspiration, and seizure precautions

## 2023-03-03 NOTE — CHART NOTE - NSCHARTNOTEFT_GEN_A_CORE
Pt was initially examined by dermatology to rule out scabies. Per dermatology's note there is a low suspicion of scabies but she was treated with 1 round of permethrin on 3/1 and is scheduled for 2nd dose on 3/8 to be cautious. Case was discussed with infectious control who agrees that pt can come off the isolation today.

## 2023-03-03 NOTE — CHART NOTE - NSCHARTNOTEFT_GEN_A_CORE
vEEG results reviewed by neurology service, showing no epileptiform activity. Recommend to continue Keppra 500mg BID and to follow-up as outpatient with neurologist: Dr. Ghassan Allen ([address] [facility], [phone]).     No further inpatient workup needed from a neurology standpoint. Please call [facility] [phone] with questions. vEEG results reviewed by neurology service, showing no epileptiform activity. Recommend to continue Keppra 500mg BID and to follow-up as outpatient with neurologist: Dr. Ghassan Allen ([address] [facility], [phone]).     No further inpatient workup needed from a neurology standpoint. Please call [facility] [phone] with questions. vEEG results reviewed by neurology service, showing no epileptiform activity. Recommend to continue Keppra 500mg BID and to follow-up as outpatient with neurologist: Dr. Ghassan Allen ([address] [facility], [phone]).     No further inpatient workup needed from a neurology standpoint. Please call [facility] [phone] with questions. vEEG results reviewed by neurology service, showing no epileptiform activity. Recommend to continue Keppra ER 500mg BID and to follow-up as outpatient with neurologist: Dr. Ghassan Allen ([address] [facility], [phone]).     No further inpatient workup needed from a neurology standpoint. Please call [facility] [phone] with questions.  Thank you vEEG results reviewed by neurology service, showing no epileptiform activity. Recommend to continue Keppra ER 500mg BID and to follow-up as outpatient with neurologist: Dr. Ghassan Allen ([address] [facility], [phone]).     No further inpatient workup needed from a neurology standpoint. Please call [facility] [phone] with questions.  Thank you vEEG results reviewed by neurology service, showing no epileptiform activity. Recommend to continue Keppra ER 500mg BID and to follow-up as outpatient with neurologist: Dr. Ghassan Allen ([address] [facility], [phone]).     No further inpatient workup needed from a neurology standpoint. Please call [facility] [phone] with questions.  Thank you

## 2023-03-03 NOTE — DISCHARGE NOTE PROVIDER - HOSPITAL COURSE
87F with history as above who presented to the hospital with complaints of LOC.   Hospital Course:   Hospital Course:   Episode of loss of consciousness.   - Patient with a few episodes of LOC but unclear on cause, did report having palpitations prior to her episodes so higher suspicion for cardiac cause but as per ED note patient's son reported her having seizures  - Neurology evaluation appreciated   -VEEG noted as above , Mild nonspecific diffuse or multifocal cerebral dysfunction. No epileptiform pattern or seizure seen. Neurology f/u appreciated, no further inpatient w/u , c/w Keppra , f/u Neurology as outpt   - Check orthostatics  - Monitor on telemetry  - Monitor for further episodes of LOC  - Fall, aspiration, and seizure precautions.    Seizure disorder.   - c/w home keppra 500mg BID  - s/p VEEG, plan as above  -seizure precautions.    Maculopapular rash.    - New onset of an intensely pruritic maculopapular rash, noted to have significant excoriations on her back and arms  - Unclear on time frame of rash, unclear on causation of rash (possibly 2/2 tramadol as per son but unknown timing of rash)  - Patient's eosinophilia likely 2/2 rash, would trend for now  -Dermatology eval appreciated, recs noted.    UTI (urinary tract infection).   - Reports having had dysuria and urinary frequency recently but unable to state when, denies any urinary complaints at present  - s/p ceftriaxone in ED, as per ED note was on keflex as outpatient  - Here UA with pyuria but also with some epithelial cells, unclear if patient still with UTI or if pyuria residual from treated UTI  - will give CTX for 2 more days to complete 3 day course.    Type 2 diabetes mellitus.   - Place on ISS, FS qAC, CC diet  -AIc 6.0   Pt on Januvia at home, will hold for now     Essential hypertension.   - Pt on Losartan 100 mg and Toprol XL 25 mg   - not sure if patient compliant with medications,  toprol  resumed , BP within goal.    Asthma.   - pt on Montelukast, Symbicort and Prednisone   - will continue.    Rheumatoid arthritis.    - Patient on Leflunomide and Prednisone  Prednisone resumed.    On 3/3/23, case discussed with Dr. Yap, pt is medically cleared/stable for discharge to rehab for further recovery. Medications reviewed. 87F with history as above who presented to the hospital with complaints of LOC.     Hospital Course:   Episode of loss of consciousness.   - Patient with a few episodes of LOC but unclear on cause, did report having palpitations prior to her episodes so higher suspicion for cardiac cause but as per ED note patient's son reported her having seizures  - Neurology evaluation appreciated   -VEEG noted as above , Mild nonspecific diffuse or multifocal cerebral dysfunction. No epileptiform pattern or seizure seen. Neurology f/u appreciated, no further inpatient w/u , c/w Keppra , f/u Neurology as outpt   - Monitored  on telemetry, no acte events   - Fall, aspiration, and seizure precautions.    Seizure disorder.   - c/w home keppra 500mg BID  - s/p VEEG, plan as above  -seizure precautions.    Maculopapular rash.    - New onset of an intensely pruritic maculopapular rash, noted to have significant excoriations on her back and arms  - Unclear on time frame of rash, unclear on causation of rash (possibly 2/2 tramadol as per son but unknown timing of rash)  - Patient's eosinophilia likely 2/2 rash, would trend for now  -Dermatology eval appreciated, recs noted.    UTI (urinary tract infection).   - Reports having had dysuria and urinary frequency recently but unable to state when, denies any urinary complaints at present  - s/p ceftriaxone in ED, as per ED note was on keflex as outpatient  - Here UA with pyuria but also with some epithelial cells, unclear if patient still with UTI or if pyuria residual from treated UTI  - will give CTX for 2 more days to complete 3 day course.    Type 2 diabetes mellitus.   - Place on ISS, FS qAC, CC diet  -AIc 6.0   Pt on Januvia at home, will hold for now     Essential hypertension.   - Pt on Losartan 100 mg and Toprol XL 25 mg   - not sure if patient compliant with medications,  toprol  resumed , BP within goal.    Asthma.   - pt on Montelukast, Symbicort and Prednisone   - will continue.    Rheumatoid arthritis.    - Patient on Leflunomide and Prednisone  Prednisone resumed.    On 3/3/23, case discussed with Dr. Yap, pt is medically cleared/stable for discharge to rehab for further recovery. Medications reviewed.

## 2023-03-08 LAB
BP 180, S: 19 RU/ML — SIGNIFICANT CHANGE UP
BP 230, S: <2 RU/ML — SIGNIFICANT CHANGE UP

## 2023-08-08 PROBLEM — Z00.00 ENCOUNTER FOR PREVENTIVE HEALTH EXAMINATION: Status: ACTIVE | Noted: 2023-08-08

## 2023-08-09 ENCOUNTER — APPOINTMENT (OUTPATIENT)
Dept: OBGYN | Facility: CLINIC | Age: 88
End: 2023-08-09
Payer: MEDICARE

## 2023-08-09 ENCOUNTER — APPOINTMENT (OUTPATIENT)
Dept: OBGYN | Facility: CLINIC | Age: 88
End: 2023-08-09

## 2023-08-09 VITALS
BODY MASS INDEX: 24.35 KG/M2 | DIASTOLIC BLOOD PRESSURE: 84 MMHG | WEIGHT: 129 LBS | HEIGHT: 61 IN | SYSTOLIC BLOOD PRESSURE: 126 MMHG

## 2023-08-09 DIAGNOSIS — N89.8 OTHER SPECIFIED NONINFLAMMATORY DISORDERS OF VAGINA: ICD-10-CM

## 2023-08-09 DIAGNOSIS — L90.0 LICHEN SCLEROSUS ET ATROPHICUS: ICD-10-CM

## 2023-08-09 DIAGNOSIS — Z86.39 PERSONAL HISTORY OF OTHER ENDOCRINE, NUTRITIONAL AND METABOLIC DISEASE: ICD-10-CM

## 2023-08-09 DIAGNOSIS — Z87.448 PERSONAL HISTORY OF OTHER DISEASES OF URINARY SYSTEM: ICD-10-CM

## 2023-08-09 DIAGNOSIS — I10 ESSENTIAL (PRIMARY) HYPERTENSION: ICD-10-CM

## 2023-08-09 DIAGNOSIS — Z87.898 PERSONAL HISTORY OF OTHER SPECIFIED CONDITIONS: ICD-10-CM

## 2023-08-09 DIAGNOSIS — Z87.39 PERSONAL HISTORY OF OTHER DISEASES OF THE MUSCULOSKELETAL SYSTEM AND CONNECTIVE TISSUE: ICD-10-CM

## 2023-08-09 DIAGNOSIS — Z86.2 PERSONAL HISTORY OF DISEASES OF THE BLOOD AND BLOOD-FORMING ORGANS AND CERTAIN DISORDERS INVOLVING THE IMMUNE MECHANISM: ICD-10-CM

## 2023-08-09 DIAGNOSIS — R32 UNSPECIFIED URINARY INCONTINENCE: ICD-10-CM

## 2023-08-09 PROCEDURE — 99204 OFFICE O/P NEW MOD 45 MIN: CPT

## 2023-08-09 NOTE — PHYSICAL EXAM
[Vulvar Atrophy] : vulvar atrophy [Vulvar Scarring] : vulvar scarring [Labia Majora] : normal [Normal] : normal [Atrophy] : atrophy [Dry Mucosa] : dry mucosa [FreeTextEntry1] : thin paper white appearing vulva and b/l areas of hyperpigmentation in labia minora [FreeTextEntry5] : difficult to assess due to pt discomfort

## 2023-08-09 NOTE — PLAN
[FreeTextEntry1] : Suspect lichen sclerosus, advised clobetasol with taper. Discussed RTO 3 mo and if not improved would recommend vulvar biopsy.

## 2023-08-09 NOTE — HISTORY OF PRESENT ILLNESS
[FreeTextEntry1] :  late 40s 89yo P3 here with daughter today for vaginal itching over last year. Reports has had whole body itching around this time, saw dermatologist but was recommended to f/u with gyn for vaginal itching. No vaginal discharge, no PMB. Has urinary incontinence and voids into diapers. Hx of T2DM, HTN, HLD. Notes weight loss of 50lb this past year, seeing PMD for f/u and had bloodwork done.

## 2023-09-01 ENCOUNTER — APPOINTMENT (OUTPATIENT)
Dept: DERMATOLOGY | Facility: CLINIC | Age: 88
End: 2023-09-01

## 2023-12-09 RX ORDER — BUDESONIDE AND FORMOTEROL FUMARATE DIHYDRATE 160; 4.5 UG/1; UG/1
0 AEROSOL RESPIRATORY (INHALATION)
Qty: 0 | Refills: 0 | DISCHARGE

## 2023-12-09 RX ORDER — SITAGLIPTIN 50 MG/1
0 TABLET, FILM COATED ORAL
Qty: 0 | Refills: 0 | DISCHARGE

## 2023-12-09 RX ORDER — LEFLUNOMIDE 10 MG/1
0 TABLET ORAL
Qty: 0 | Refills: 0 | DISCHARGE

## 2023-12-09 RX ORDER — TRAMADOL HYDROCHLORIDE 50 MG/1
0 TABLET ORAL
Qty: 0 | Refills: 0 | DISCHARGE

## 2023-12-09 RX ORDER — LOSARTAN POTASSIUM 100 MG/1
0 TABLET, FILM COATED ORAL
Qty: 0 | Refills: 0 | DISCHARGE

## 2023-12-09 RX ORDER — CLOTRIMAZOLE AND BETAMETHASONE DIPROPIONATE 10; .5 MG/G; MG/G
0 CREAM TOPICAL
Qty: 0 | Refills: 0 | DISCHARGE

## 2023-12-09 RX ORDER — MONTELUKAST 4 MG/1
0 TABLET, CHEWABLE ORAL
Qty: 0 | Refills: 0 | DISCHARGE

## 2023-12-09 RX ORDER — METOPROLOL TARTRATE 50 MG
0 TABLET ORAL
Qty: 0 | Refills: 0 | DISCHARGE

## 2023-12-09 RX ORDER — ASPIRIN/CALCIUM CARB/MAGNESIUM 324 MG
0 TABLET ORAL
Qty: 0 | Refills: 0 | DISCHARGE

## 2023-12-09 RX ORDER — LEVETIRACETAM 250 MG/1
0 TABLET, FILM COATED ORAL
Qty: 0 | Refills: 0 | DISCHARGE

## 2023-12-09 RX ORDER — ALENDRONATE SODIUM 70 MG/1
0 TABLET ORAL
Qty: 0 | Refills: 0 | DISCHARGE

## 2023-12-09 RX ORDER — CETIRIZINE HYDROCHLORIDE 10 MG/1
0 TABLET ORAL
Qty: 0 | Refills: 0 | DISCHARGE

## 2024-02-05 RX ORDER — CLOBETASOL PROPIONATE 0.5 MG/G
0.05 OINTMENT TOPICAL
Qty: 1 | Refills: 2 | Status: ACTIVE | COMMUNITY
Start: 2023-08-09 | End: 1900-01-01

## 2024-02-20 ENCOUNTER — EMERGENCY (EMERGENCY)
Facility: HOSPITAL | Age: 89
LOS: 1 days | Discharge: HOME | End: 2024-02-20
Attending: STUDENT IN AN ORGANIZED HEALTH CARE EDUCATION/TRAINING PROGRAM | Admitting: STUDENT IN AN ORGANIZED HEALTH CARE EDUCATION/TRAINING PROGRAM
Payer: MEDICARE

## 2024-02-20 VITALS
OXYGEN SATURATION: 100 % | DIASTOLIC BLOOD PRESSURE: 79 MMHG | SYSTOLIC BLOOD PRESSURE: 160 MMHG | RESPIRATION RATE: 18 BRPM | TEMPERATURE: 98 F | HEART RATE: 79 BPM

## 2024-02-20 VITALS
RESPIRATION RATE: 17 BRPM | DIASTOLIC BLOOD PRESSURE: 79 MMHG | SYSTOLIC BLOOD PRESSURE: 140 MMHG | OXYGEN SATURATION: 99 % | HEART RATE: 77 BPM | TEMPERATURE: 98 F

## 2024-02-20 DIAGNOSIS — Z98.49 CATARACT EXTRACTION STATUS, UNSPECIFIED EYE: Chronic | ICD-10-CM

## 2024-02-20 LAB
ALBUMIN SERPL ELPH-MCNC: 4.1 G/DL — SIGNIFICANT CHANGE UP (ref 3.3–5)
ALP SERPL-CCNC: 77 U/L — SIGNIFICANT CHANGE UP (ref 40–120)
ALT FLD-CCNC: 14 U/L — SIGNIFICANT CHANGE UP (ref 4–33)
ANION GAP SERPL CALC-SCNC: 7 MMOL/L — SIGNIFICANT CHANGE UP (ref 7–14)
ANISOCYTOSIS BLD QL: SIGNIFICANT CHANGE UP
AST SERPL-CCNC: 37 U/L — HIGH (ref 4–32)
BASOPHILS # BLD AUTO: 0.2 K/UL — SIGNIFICANT CHANGE UP (ref 0–0.2)
BASOPHILS NFR BLD AUTO: 4.3 % — HIGH (ref 0–2)
BILIRUB SERPL-MCNC: 0.3 MG/DL — SIGNIFICANT CHANGE UP (ref 0.2–1.2)
BUN SERPL-MCNC: 20 MG/DL — SIGNIFICANT CHANGE UP (ref 7–23)
CALCIUM SERPL-MCNC: 9.1 MG/DL — SIGNIFICANT CHANGE UP (ref 8.4–10.5)
CHLORIDE SERPL-SCNC: 109 MMOL/L — HIGH (ref 98–107)
CO2 SERPL-SCNC: 24 MMOL/L — SIGNIFICANT CHANGE UP (ref 22–31)
CREAT SERPL-MCNC: 1.15 MG/DL — SIGNIFICANT CHANGE UP (ref 0.5–1.3)
EGFR: 46 ML/MIN/1.73M2 — LOW
EOSINOPHIL # BLD AUTO: 0.76 K/UL — HIGH (ref 0–0.5)
EOSINOPHIL NFR BLD AUTO: 16.2 % — HIGH (ref 0–6)
GLUCOSE SERPL-MCNC: 95 MG/DL — SIGNIFICANT CHANGE UP (ref 70–99)
HCT VFR BLD CALC: 33.7 % — LOW (ref 34.5–45)
HGB BLD-MCNC: 10.1 G/DL — LOW (ref 11.5–15.5)
HYPOCHROMIA BLD QL: SLIGHT — SIGNIFICANT CHANGE UP
IANC: 1.5 K/UL — LOW (ref 1.8–7.4)
LIDOCAIN IGE QN: 72 U/L — HIGH (ref 7–60)
LYMPHOCYTES # BLD AUTO: 1 K/UL — SIGNIFICANT CHANGE UP (ref 1–3.3)
LYMPHOCYTES # BLD AUTO: 21.4 % — SIGNIFICANT CHANGE UP (ref 13–44)
MACROCYTES BLD QL: SIGNIFICANT CHANGE UP
MCHC RBC-ENTMCNC: 29.2 PG — SIGNIFICANT CHANGE UP (ref 27–34)
MCHC RBC-ENTMCNC: 30 GM/DL — LOW (ref 32–36)
MCV RBC AUTO: 97.4 FL — SIGNIFICANT CHANGE UP (ref 80–100)
MONOCYTES # BLD AUTO: 1.16 K/UL — HIGH (ref 0–0.9)
MONOCYTES NFR BLD AUTO: 24.8 % — HIGH (ref 2–14)
NEUTROPHILS # BLD AUTO: 1.56 K/UL — LOW (ref 1.8–7.4)
NEUTROPHILS NFR BLD AUTO: 31.6 % — LOW (ref 43–77)
NEUTS BAND # BLD: 1.7 % — SIGNIFICANT CHANGE UP (ref 0–6)
OVALOCYTES BLD QL SMEAR: SLIGHT — SIGNIFICANT CHANGE UP
PLAT MORPH BLD: NORMAL — SIGNIFICANT CHANGE UP
PLATELET # BLD AUTO: 189 K/UL — SIGNIFICANT CHANGE UP (ref 150–400)
PLATELET COUNT - ESTIMATE: NORMAL — SIGNIFICANT CHANGE UP
POIKILOCYTOSIS BLD QL AUTO: SLIGHT — SIGNIFICANT CHANGE UP
POLYCHROMASIA BLD QL SMEAR: SLIGHT — SIGNIFICANT CHANGE UP
POTASSIUM SERPL-MCNC: 5 MMOL/L — SIGNIFICANT CHANGE UP (ref 3.5–5.3)
POTASSIUM SERPL-SCNC: 5 MMOL/L — SIGNIFICANT CHANGE UP (ref 3.5–5.3)
PROT SERPL-MCNC: 7.6 G/DL — SIGNIFICANT CHANGE UP (ref 6–8.3)
RBC # BLD: 3.46 M/UL — LOW (ref 3.8–5.2)
RBC # FLD: 17.7 % — HIGH (ref 10.3–14.5)
RBC BLD AUTO: ABNORMAL
SODIUM SERPL-SCNC: 140 MMOL/L — SIGNIFICANT CHANGE UP (ref 135–145)
WBC # BLD: 4.67 K/UL — SIGNIFICANT CHANGE UP (ref 3.8–10.5)
WBC # FLD AUTO: 4.67 K/UL — SIGNIFICANT CHANGE UP (ref 3.8–10.5)

## 2024-02-20 PROCEDURE — 99285 EMERGENCY DEPT VISIT HI MDM: CPT

## 2024-02-20 PROCEDURE — 73030 X-RAY EXAM OF SHOULDER: CPT | Mod: 26,LT

## 2024-02-20 PROCEDURE — 72128 CT CHEST SPINE W/O DYE: CPT | Mod: 26,MA

## 2024-02-20 PROCEDURE — 73000 X-RAY EXAM OF COLLAR BONE: CPT | Mod: 26,LT

## 2024-02-20 PROCEDURE — 73060 X-RAY EXAM OF HUMERUS: CPT | Mod: 26,LT

## 2024-02-20 PROCEDURE — 72131 CT LUMBAR SPINE W/O DYE: CPT | Mod: 26,MA

## 2024-02-20 PROCEDURE — 74177 CT ABD & PELVIS W/CONTRAST: CPT | Mod: 26,MA

## 2024-02-20 PROCEDURE — 70450 CT HEAD/BRAIN W/O DYE: CPT | Mod: 26,MA

## 2024-02-20 PROCEDURE — 71260 CT THORAX DX C+: CPT | Mod: 26,MA

## 2024-02-20 PROCEDURE — 71045 X-RAY EXAM CHEST 1 VIEW: CPT | Mod: 26

## 2024-02-20 PROCEDURE — 72125 CT NECK SPINE W/O DYE: CPT | Mod: 26,MA

## 2024-02-20 RX ORDER — ACETAMINOPHEN 500 MG
650 TABLET ORAL ONCE
Refills: 0 | Status: COMPLETED | OUTPATIENT
Start: 2024-02-20 | End: 2024-02-20

## 2024-02-20 RX ADMIN — Medication 650 MILLIGRAM(S): at 21:19

## 2024-02-20 NOTE — ED ADULT NURSE NOTE - ISOLATION TYPE:
None done Complex Repair And Flap Additional Text (Will Appearing After The Standard Complex Repair Text): The complex repair was not sufficient to completely close the primary defect. The remaining additional defect was repaired with the flap mentioned below.

## 2024-02-20 NOTE — ED ADULT NURSE NOTE - ALCOHOL PRE SCREEN (AUDIT - C)
----- Message from Megan Pabon sent at 10/19/2017  4:11 PM CDT -----  Contact: Shireen Arnett (Mother)  Shireen Arnett (Mother) calling to schedule a same day appt today. The patient has a fever, sore throat and a barking cough. Please advise.  Call back   Thanks!   Statement Selected

## 2024-02-20 NOTE — ED PROVIDER NOTE - PATIENT PORTAL LINK FT
You can access the FollowMyHealth Patient Portal offered by [facility] by registering at the following website: [email or web address]. By joining [employer]’s FollowMyHealth portal, you will also be able to view your health information using other applications (apps) compatible with our system.

## 2024-02-20 NOTE — ED ADULT TRIAGE NOTE - CHIEF COMPLAINT QUOTE
patient c/o mechanical fall on 2/13, denies any head strike or loss of consciousness. patient now endorsing pain to right lower back that is sensitive to touch. past medical history of asthma, hypertension, vertigo, diabetes mellitus type 2

## 2024-02-20 NOTE — ED ADULT NURSE NOTE - OBJECTIVE STATEMENT
Patient received to bed 6A A&Ox4, ambulatory w walker at baseline, Kiswahili speaking, accompanied by family member past medical history of asthma, hypertension, vertigo, diabetes mellitus type 2, osteoporosis  who is translating coming to the ED for c/o mechanical fall on Saturday. States to have been cooking and got dizzy and fell. denies head strike, blood thinner use, loss of consciousness. Patient now endorsing pain to right lower back, neck, left shoulder. +rom, no obvious injury noted. 22G IV placed to the L AC, labs drawn and sent. Awaiting lab results and imaging. Care plan continued. Comfort measures provided. Safety maintained.

## 2024-02-20 NOTE — ED PROVIDER NOTE - CLINICAL SUMMARY MEDICAL DECISION MAKING FREE TEXT BOX
Patient concerning for hip fracture lumbar fracture clavicular fracture humeral fracture intracranial bleeding.  Will obtain CT head, abdomen pelvis, lumbar spine bilateral hips x-ray shoulder clavicle.  Basic labs to assess electrolyte balance.

## 2024-02-20 NOTE — ED PROVIDER NOTE - ATTENDING CONTRIBUTION TO CARE
Jake Rajput DO:  patient seen and evaluated with the resident.  I was present for key portions of the History & Physical, and I agree with the Impression & Plan. 87 yo f pmh PUD, CAD s/p stents x7, CVA with residual right sided weakness, HTN, HLD, DM2 , pw hip pain. PW son bedside provides history and translation and collateral, official  declined.  Reports 1 week prior patient had mechanical fall, backwards.  Reports since then has had worsening pain in right hip.  Does report pain diffusely throughout body however has history of arthritis which he is followed for outpatient.  States hip pain is worse in right hip.  Denies N/V, F/C, paresthesias, weakness.  Has scheduled MRI of the back this week for chronic back pain.  Patient arrives HDS well-appearing moving all extremities.  No gross deformities.  Neurovascular intact.  Will evaluate for hip fracture.  Eval for occult injury.  Labs, imaging, reassess. do not suspect cauda equina, cord compression. denies hx malignancy, trauma, iv drug use, bowel/bladder incontinence, saddle anesthesia. Jake Rajput DO:  patient seen and evaluated with the resident.  I was present for key portions of the History & Physical, and I agree with the Impression & Plan. 89 yo f pmh PUD, CAD s/p stents x7, CVA with residual right sided weakness, HTN, HLD, DM2 , pw hip pain. PW son bedside provides history and translation and collateral, official  declined.  Reports 1 week prior patient had mechanical fall, backwards.  Reports since then has had worsening pain in right hip.  Does report pain diffusely throughout body however has history of arthritis which he is followed for outpatient.  States hip pain is worse in right hip.  Denies N/V, F/C, paresthesias, weakness.  Has scheduled MRI of the back this week for chronic back pain.  Patient arrives HDS well-appearing moving all extremities.  No gross deformities.  Neurovascular intact.  Will evaluate for hip fracture.  Eval for occult injury.  Labs, imaging, reassess. do not suspect cauda equina, cord compression. denies hx malignancy, trauma, iv drug use, bowel/bladder incontinence, saddle anesthesia..

## 2024-02-20 NOTE — ED ADULT NURSE NOTE - NSFALLRISKINTERV_ED_ALL_ED

## 2024-02-20 NOTE — ED PROVIDER NOTE - NSFOLLOWUPINSTRUCTIONS_ED_ALL_ED_FT
You were seen in the Emergency Department for fall.    1) Continue all previously prescribed medications as directed.    2) Follow up with your primary care physician - take copies of your results.    3) Return to the Emergency Department for worsening or persistent symptoms, and/or ANY NEW OR CONCERNING SYMPTOMS.     Contusion  A contusion is a deep bruise. This is a result of an injury that causes bleeding under the skin. Symptoms of bruising include pain, swelling, and discolored skin. The skin may turn blue, purple, or yellow.    Follow these instructions at home:  Managing pain, stiffness, and swelling    Bag of ice on a towel on the skin.  You may use RICE. This stands for:  Resting.  Icing.  Compression, or putting pressure on the injured area.  Elevating, or raising the injured area.  To follow this method, do these actions:  Rest the injured area.  If told, put ice on the injured area. To do this:  Put ice in a plastic bag.  Place a towel between your skin and the bag.  Leave the ice on for 20 minutes, 2–3 times per day.  If your skin turns bright red, take off the ice right away to prevent skin damage. The risk of skin damage is higher if you cannot feel pain, heat, or cold.  If told, apply compression on the injured area using an elastic bandage. Make sure the bandage is not too tight. If the area tingles or has a loss of feeling (numbness), remove it and put it back on as told by your doctor.  If possible, elevate the injured area above the level of your heart while you are sitting or lying down.    General instructions  Take over-the-counter and prescription medicines only as told by your doctor.  Keep all follow-up visits. Your doctor may want to see how your contusion is healing with treatment.    Contact a doctor if:  Your symptoms do not get better after several days of treatment.  Your symptoms get worse.  You have trouble moving the injured area.    Get help right away if:  You have very bad pain.  You have a loss of feeling (numbness) in a hand or foot.  Your hand or foot turns pale or cold.

## 2024-02-20 NOTE — ED ADULT NURSE REASSESSMENT NOTE - NS ED NURSE REASSESS COMMENT FT1
Report received from JOSÉ ANTONIO Alcaraz. Pt at baseline mental status, breathing even and unlabored in bed. Pt denies chest pain, SOB, dizziness, headache, blurry vision, chills. Bed in lowest position, call bell within reach. Pt c/o lower back pain at this time and requesting meds, MD De Paz made aware.

## 2024-02-20 NOTE — ED PROVIDER NOTE - PROGRESS NOTE DETAILS
Baldev PGY1: received patient at [facility].  patient with fall 3 days ago, unable to stand/ambulate since.  pending labs, ct, xray.  if no acute findings, will trial ambulation. Baldev PGY1: patient and family updated on results.  no acute findings at this time.  patient with mild low back pain.  will order tylenol.  patient able to ambulate with rolaid.  plan to d/c with follow up to pcp.  tylenol at home for pain.  patient and family understand and agree.

## 2024-02-20 NOTE — ED PROVIDER NOTE - OBJECTIVE STATEMENT
88-year-old female [facility] osteoporosis vertigo diabetes hypertension asthma presenting post fall  Patient had an unwitnessed fall on the 17th while she was taking in the kitchen.  Patient states she lost balance and fell backwards falling face up on the floor.  The patient states she was unable to get up on her own had to call her son to help her up.  The son was at bedside states she has not been ambulating since then.  Patient endorses low back pain lumbar and sacral regions, left hip pain, midsternal pain, left shoulder and left clavicular pain, bilateral temporal headache with fullness in the left ear.  Patient denies changes in hearing vision paresthesias numbness.  Patient states she feels experiences some pain in the sternum when she is taking big breaths.  Patient states no LOC or head trauma and endorses left-sided neck pain.  Patient  states since the fall she has had abdominal pain.

## 2024-02-20 NOTE — ED PROVIDER NOTE - PHYSICAL EXAMINATION
GENERAL: lying in bed uncomfortably on left side  HEAD:  Atraumatic, normocephalic  EYES: EOMI, PERRLA, conjunctiva and sclera clear  NECK: Supple, trachea midline, no JVD  HEART: Regular rate and rhythm, no murmurs, rubs, or gallops  LUNGS: Unlabored respirations.  Clear to auscultation bilaterally, no crackles, wheezing, or rhonchi  ABDOMEN: Soft, nondistended, +BS, Tenderness epigastric region and LUQ  MSK: tenderness over left clavicle, shoulder, lest sternocostal cartilage, lumbar spine midline, sacrum, left hip  EXTREMITIES: 2+ peripheral pulses bilaterally. No clubbing, cyanosis, or edema  NERVOUS SYSTEM:  A&Ox3, moving all extremities, no focal deficits   SKIN: No rashes or lesions

## 2024-02-21 PROBLEM — J45.909 UNSPECIFIED ASTHMA, UNCOMPLICATED: Chronic | Status: ACTIVE | Noted: 2023-03-01

## 2024-02-21 PROBLEM — E11.9 TYPE 2 DIABETES MELLITUS WITHOUT COMPLICATIONS: Chronic | Status: ACTIVE | Noted: 2023-02-28

## 2024-02-21 PROBLEM — E78.5 HYPERLIPIDEMIA, UNSPECIFIED: Chronic | Status: ACTIVE | Noted: 2023-03-01

## 2024-02-21 PROBLEM — M06.9 RHEUMATOID ARTHRITIS, UNSPECIFIED: Chronic | Status: ACTIVE | Noted: 2023-02-28

## 2024-02-21 PROBLEM — I10 ESSENTIAL (PRIMARY) HYPERTENSION: Chronic | Status: ACTIVE | Noted: 2023-03-01

## 2024-02-21 PROBLEM — Z86.69 PERSONAL HISTORY OF OTHER DISEASES OF THE NERVOUS SYSTEM AND SENSE ORGANS: Chronic | Status: ACTIVE | Noted: 2023-02-28

## 2024-05-02 ENCOUNTER — APPOINTMENT (OUTPATIENT)
Dept: OBGYN | Facility: CLINIC | Age: 89
End: 2024-05-02

## 2024-08-19 ENCOUNTER — APPOINTMENT (OUTPATIENT)
Dept: OBGYN | Facility: CLINIC | Age: 89
End: 2024-08-19
Payer: MEDICARE

## 2024-08-19 VITALS — DIASTOLIC BLOOD PRESSURE: 70 MMHG | HEIGHT: 61 IN | SYSTOLIC BLOOD PRESSURE: 148 MMHG

## 2024-08-19 DIAGNOSIS — N89.8 OTHER SPECIFIED NONINFLAMMATORY DISORDERS OF VAGINA: ICD-10-CM

## 2024-08-19 DIAGNOSIS — N90.89 OTHER SPECIFIED NONINFLAMMATORY DISORDERS OF VULVA AND PERINEUM: ICD-10-CM

## 2024-08-19 DIAGNOSIS — L90.0 LICHEN SCLEROSUS ET ATROPHICUS: ICD-10-CM

## 2024-08-19 PROCEDURE — 99214 OFFICE O/P EST MOD 30 MIN: CPT

## 2024-08-19 NOTE — PHYSICAL EXAM
[Vulvar Atrophy] : vulvar atrophy [Vulvar Scarring] : vulvar scarring [Labia Majora] : normal [Normal] : normal [Atrophy] : atrophy [Dry Mucosa] : dry mucosa [FreeTextEntry1] : thin paper white appearing vulva and b/l areas of hyperpigmentation in labia minora extending to perirectal area, separate 1.5cm hyperpigmented area slightly raised

## 2024-08-19 NOTE — PLAN
[FreeTextEntry1] : Suspect LS however given area of 1.5cm hyperpigmentation and slightly raised area recommend vulvar biopsy. Dw recs w pt and daughter, pt declines biopsy at this time. Discussed concern for possible abnormal growth. Pt desires to trial clobetasol again for 3mo course and RTO 3mo for reassessment and possible vulvar bx at that time.

## 2024-08-19 NOTE — HISTORY OF PRESENT ILLNESS
[FreeTextEntry1] : 88yo P3 here for vulvar itching, was seen last year for similar complaints. +LS and was given clobetasol w taper which improved symptoms. However recently noted itching again since the spring, took clobetasol for several weeks without improvement. Daughter unsure if was using clobetasol properaly. +Diapers for incontinence, T2DM, HTN, HLD. Here with daughter today.

## 2025-05-01 ENCOUNTER — APPOINTMENT (OUTPATIENT)
Dept: UROLOGY | Facility: CLINIC | Age: 89
End: 2025-05-01
Payer: MEDICARE

## 2025-05-01 VITALS
SYSTOLIC BLOOD PRESSURE: 148 MMHG | TEMPERATURE: 97 F | OXYGEN SATURATION: 90 % | HEART RATE: 90 BPM | DIASTOLIC BLOOD PRESSURE: 79 MMHG

## 2025-05-01 VITALS — BODY MASS INDEX: 22.3 KG/M2 | WEIGHT: 118 LBS

## 2025-05-01 DIAGNOSIS — R32 UNSPECIFIED URINARY INCONTINENCE: ICD-10-CM

## 2025-05-01 DIAGNOSIS — N89.8 OTHER SPECIFIED NONINFLAMMATORY DISORDERS OF VAGINA: ICD-10-CM

## 2025-05-01 DIAGNOSIS — L90.0 LICHEN SCLEROSUS ET ATROPHICUS: ICD-10-CM

## 2025-05-01 PROCEDURE — 99204 OFFICE O/P NEW MOD 45 MIN: CPT

## 2025-05-01 RX ORDER — CLOTRIMAZOLE AND BETAMETHASONE DIPROPIONATE 10; .5 MG/ML; MG/ML
1-0.05 LOTION TOPICAL TWICE DAILY
Qty: 1 | Refills: 0 | Status: ACTIVE | COMMUNITY
Start: 2025-05-01 | End: 1900-01-01

## 2025-05-01 RX ORDER — TROSPIUM CHLORIDE 20 MG/1
20 TABLET, FILM COATED ORAL
Qty: 60 | Refills: 1 | Status: ACTIVE | COMMUNITY
Start: 2025-05-01 | End: 1900-01-01

## 2025-05-01 RX ORDER — ESTRADIOL 0.1 MG/G
0.1 CREAM VAGINAL
Qty: 1 | Refills: 1 | Status: ACTIVE | COMMUNITY
Start: 2025-05-01 | End: 1900-01-01

## 2025-05-05 LAB — BACTERIA UR CULT: NORMAL

## 2025-05-05 RX ORDER — LEFLUNOMIDE 20 MG/1
20 TABLET, FILM COATED ORAL
Refills: 0 | Status: ACTIVE | COMMUNITY

## 2025-05-05 RX ORDER — MONTELUKAST SODIUM 10 MG/1
10 TABLET, FILM COATED ORAL
Refills: 0 | Status: ACTIVE | COMMUNITY

## 2025-05-05 RX ORDER — LOSARTAN POTASSIUM 100 MG/1
TABLET, FILM COATED ORAL
Refills: 0 | Status: ACTIVE | COMMUNITY

## 2025-05-05 RX ORDER — METOPROLOL SUCCINATE 25 MG/1
25 TABLET, EXTENDED RELEASE ORAL
Refills: 0 | Status: ACTIVE | COMMUNITY

## 2025-05-05 RX ORDER — PREDNISONE 5 MG/1
5 TABLET ORAL
Refills: 0 | Status: ACTIVE | COMMUNITY

## 2025-05-05 RX ORDER — ATORVASTATIN CALCIUM 20 MG/1
20 TABLET, FILM COATED ORAL
Refills: 0 | Status: ACTIVE | COMMUNITY

## 2025-05-05 RX ORDER — LEVETIRACETAM 500 MG/1
500 TABLET, FILM COATED ORAL
Refills: 0 | Status: ACTIVE | COMMUNITY

## 2025-05-29 ENCOUNTER — APPOINTMENT (OUTPATIENT)
Dept: UROLOGY | Facility: CLINIC | Age: 89
End: 2025-05-29

## 2025-08-01 ENCOUNTER — APPOINTMENT (OUTPATIENT)
Dept: UROLOGY | Facility: CLINIC | Age: 89
End: 2025-08-01
Payer: MEDICARE

## 2025-08-01 ENCOUNTER — NON-APPOINTMENT (OUTPATIENT)
Age: 89
End: 2025-08-01

## 2025-08-01 VITALS
SYSTOLIC BLOOD PRESSURE: 153 MMHG | HEART RATE: 69 BPM | BODY MASS INDEX: 20.58 KG/M2 | DIASTOLIC BLOOD PRESSURE: 70 MMHG | OXYGEN SATURATION: 95 % | HEIGHT: 61 IN | WEIGHT: 109 LBS

## 2025-08-01 DIAGNOSIS — L90.0 LICHEN SCLEROSUS ET ATROPHICUS: ICD-10-CM

## 2025-08-01 DIAGNOSIS — N89.8 OTHER SPECIFIED NONINFLAMMATORY DISORDERS OF VAGINA: ICD-10-CM

## 2025-08-01 DIAGNOSIS — R32 UNSPECIFIED URINARY INCONTINENCE: ICD-10-CM

## 2025-08-01 PROCEDURE — G2211 COMPLEX E/M VISIT ADD ON: CPT

## 2025-08-01 PROCEDURE — 99214 OFFICE O/P EST MOD 30 MIN: CPT

## 2025-08-03 LAB
APPEARANCE: CLEAR
BACTERIA: NEGATIVE /HPF
BILIRUBIN URINE: NEGATIVE
BLOOD URINE: NEGATIVE
CAST: 0 /LPF
COLOR: YELLOW
EPITHELIAL CELLS: 1 /HPF
GLUCOSE QUALITATIVE U: NEGATIVE MG/DL
KETONES URINE: NEGATIVE MG/DL
LEUKOCYTE ESTERASE URINE: NEGATIVE
MICROSCOPIC-UA: NORMAL
NITRITE URINE: NEGATIVE
PH URINE: 7.5
PROTEIN URINE: NORMAL MG/DL
RED BLOOD CELLS URINE: 0 /HPF
SPECIFIC GRAVITY URINE: 1.01
UROBILINOGEN URINE: 0.2 MG/DL
WHITE BLOOD CELLS URINE: 0 /HPF

## 2025-08-05 LAB — BACTERIA UR CULT: ABNORMAL
